# Patient Record
Sex: MALE | Race: OTHER | NOT HISPANIC OR LATINO
[De-identification: names, ages, dates, MRNs, and addresses within clinical notes are randomized per-mention and may not be internally consistent; named-entity substitution may affect disease eponyms.]

---

## 2018-02-05 ENCOUNTER — TRANSCRIPTION ENCOUNTER (OUTPATIENT)
Age: 33
End: 2018-02-05

## 2018-02-09 ENCOUNTER — APPOINTMENT (OUTPATIENT)
Dept: UROLOGY | Facility: CLINIC | Age: 33
End: 2018-02-09

## 2021-01-30 ENCOUNTER — EMERGENCY (EMERGENCY)
Facility: HOSPITAL | Age: 36
LOS: 1 days | Discharge: HOME | End: 2021-01-30
Attending: EMERGENCY MEDICINE
Payer: COMMERCIAL

## 2021-01-30 VITALS
HEART RATE: 93 BPM | SYSTOLIC BLOOD PRESSURE: 101 MMHG | DIASTOLIC BLOOD PRESSURE: 69 MMHG | OXYGEN SATURATION: 98 % | RESPIRATION RATE: 20 BRPM

## 2021-01-30 VITALS
HEART RATE: 128 BPM | WEIGHT: 160.06 LBS | HEIGHT: 68 IN | OXYGEN SATURATION: 97 % | TEMPERATURE: 101 F | DIASTOLIC BLOOD PRESSURE: 73 MMHG | SYSTOLIC BLOOD PRESSURE: 114 MMHG | RESPIRATION RATE: 20 BRPM

## 2021-01-30 LAB
ALBUMIN SERPL ELPH-MCNC: 3.5 G/DL — SIGNIFICANT CHANGE UP (ref 3.3–5)
ALP SERPL-CCNC: 49 U/L — SIGNIFICANT CHANGE UP (ref 40–120)
ALT FLD-CCNC: 37 U/L — SIGNIFICANT CHANGE UP (ref 10–45)
ANION GAP SERPL CALC-SCNC: 14 MMOL/L — SIGNIFICANT CHANGE UP (ref 5–17)
APTT BLD: 35.3 SEC — SIGNIFICANT CHANGE UP (ref 27.5–35.5)
AST SERPL-CCNC: 42 U/L — HIGH (ref 10–40)
BASOPHILS # BLD AUTO: 0 K/UL — SIGNIFICANT CHANGE UP (ref 0–0.2)
BASOPHILS NFR BLD AUTO: 0 % — SIGNIFICANT CHANGE UP (ref 0–2)
BILIRUB SERPL-MCNC: 0.2 MG/DL — SIGNIFICANT CHANGE UP (ref 0.2–1.2)
BUN SERPL-MCNC: 10 MG/DL — SIGNIFICANT CHANGE UP (ref 7–23)
CALCIUM SERPL-MCNC: 8.2 MG/DL — LOW (ref 8.4–10.5)
CHLORIDE SERPL-SCNC: 101 MMOL/L — SIGNIFICANT CHANGE UP (ref 96–108)
CO2 SERPL-SCNC: 20 MMOL/L — LOW (ref 22–31)
CREAT SERPL-MCNC: 0.82 MG/DL — SIGNIFICANT CHANGE UP (ref 0.5–1.3)
D DIMER BLD IA.RAPID-MCNC: 265 NG/ML DDU — HIGH
EOSINOPHIL # BLD AUTO: 0 K/UL — SIGNIFICANT CHANGE UP (ref 0–0.5)
EOSINOPHIL NFR BLD AUTO: 0 % — SIGNIFICANT CHANGE UP (ref 0–6)
GLUCOSE SERPL-MCNC: 113 MG/DL — HIGH (ref 70–99)
HCT VFR BLD CALC: 40.8 % — SIGNIFICANT CHANGE UP (ref 39–50)
HGB BLD-MCNC: 13 G/DL — SIGNIFICANT CHANGE UP (ref 13–17)
INR BLD: 1.13 RATIO — SIGNIFICANT CHANGE UP (ref 0.88–1.16)
LYMPHOCYTES # BLD AUTO: 0.64 K/UL — LOW (ref 1–3.3)
LYMPHOCYTES # BLD AUTO: 20.2 % — SIGNIFICANT CHANGE UP (ref 13–44)
MANUAL SMEAR VERIFICATION: SIGNIFICANT CHANGE UP
MCHC RBC-ENTMCNC: 27.5 PG — SIGNIFICANT CHANGE UP (ref 27–34)
MCHC RBC-ENTMCNC: 31.9 GM/DL — LOW (ref 32–36)
MCV RBC AUTO: 86.3 FL — SIGNIFICANT CHANGE UP (ref 80–100)
MONOCYTES # BLD AUTO: 0.03 K/UL — SIGNIFICANT CHANGE UP (ref 0–0.9)
MONOCYTES NFR BLD AUTO: 0.9 % — LOW (ref 2–14)
NEUTROPHILS # BLD AUTO: 2.49 K/UL — SIGNIFICANT CHANGE UP (ref 1.8–7.4)
NEUTROPHILS NFR BLD AUTO: 76.3 % — SIGNIFICANT CHANGE UP (ref 43–77)
NEUTS BAND # BLD: 2.6 % — SIGNIFICANT CHANGE UP (ref 0–8)
PLAT MORPH BLD: NORMAL — SIGNIFICANT CHANGE UP
PLATELET # BLD AUTO: 239 K/UL — SIGNIFICANT CHANGE UP (ref 150–400)
POTASSIUM SERPL-MCNC: 4.2 MMOL/L — SIGNIFICANT CHANGE UP (ref 3.5–5.3)
POTASSIUM SERPL-SCNC: 4.2 MMOL/L — SIGNIFICANT CHANGE UP (ref 3.5–5.3)
PROT SERPL-MCNC: 8.3 G/DL — SIGNIFICANT CHANGE UP (ref 6–8.3)
PROTHROM AB SERPL-ACNC: 13.5 SEC — SIGNIFICANT CHANGE UP (ref 10.6–13.6)
RBC # BLD: 4.73 M/UL — SIGNIFICANT CHANGE UP (ref 4.2–5.8)
RBC # FLD: 15.4 % — HIGH (ref 10.3–14.5)
RBC BLD AUTO: SIGNIFICANT CHANGE UP
SODIUM SERPL-SCNC: 135 MMOL/L — SIGNIFICANT CHANGE UP (ref 135–145)
WBC # BLD: 3.15 K/UL — LOW (ref 3.8–10.5)
WBC # FLD AUTO: 3.15 K/UL — LOW (ref 3.8–10.5)

## 2021-01-30 PROCEDURE — 71045 X-RAY EXAM CHEST 1 VIEW: CPT | Mod: 26

## 2021-01-30 PROCEDURE — 80053 COMPREHEN METABOLIC PANEL: CPT

## 2021-01-30 PROCEDURE — 99285 EMERGENCY DEPT VISIT HI MDM: CPT

## 2021-01-30 PROCEDURE — 93971 EXTREMITY STUDY: CPT

## 2021-01-30 PROCEDURE — 71045 X-RAY EXAM CHEST 1 VIEW: CPT

## 2021-01-30 PROCEDURE — 85025 COMPLETE CBC W/AUTO DIFF WBC: CPT

## 2021-01-30 PROCEDURE — 85730 THROMBOPLASTIN TIME PARTIAL: CPT

## 2021-01-30 PROCEDURE — 93971 EXTREMITY STUDY: CPT | Mod: 26

## 2021-01-30 PROCEDURE — 85379 FIBRIN DEGRADATION QUANT: CPT

## 2021-01-30 PROCEDURE — 99284 EMERGENCY DEPT VISIT MOD MDM: CPT

## 2021-01-30 PROCEDURE — 85610 PROTHROMBIN TIME: CPT

## 2021-01-30 RX ORDER — ACETAMINOPHEN 500 MG
650 TABLET ORAL ONCE
Refills: 0 | Status: COMPLETED | OUTPATIENT
Start: 2021-01-30 | End: 2021-01-30

## 2021-01-30 RX ORDER — SODIUM CHLORIDE 9 MG/ML
1000 INJECTION, SOLUTION INTRAVENOUS ONCE
Refills: 0 | Status: COMPLETED | OUTPATIENT
Start: 2021-01-30 | End: 2021-01-30

## 2021-01-30 RX ADMIN — Medication 650 MILLIGRAM(S): at 15:55

## 2021-01-30 RX ADMIN — SODIUM CHLORIDE 1000 MILLILITER(S): 9 INJECTION, SOLUTION INTRAVENOUS at 15:55

## 2021-01-30 NOTE — ED PROVIDER NOTE - OBJECTIVE STATEMENT
35y M pmhx spontaneous DVT LLE ~10 years ago, formerly on AC ~5 years ago presents to ED c/o L groin pain x 1 day. Known COVID+ yesterday at , cxr w/ pna given Doxycycline, symptomatic x 7 days w/ fever and mild dry cough responsive to Tylenol last taken 10AM today. Presents to ED due to new groin pain which is similar in nature to previous DVT. Follows regularly w/ hematology. Endorses "chest pressure" with deep inspiration. Denies HA, chest pain, SOB, pleuritic cp, calf pain or edema.

## 2021-01-30 NOTE — ED PROVIDER NOTE - PATIENT PORTAL LINK FT
You can access the FollowMyHealth Patient Portal offered by [facility] by registering at the following website: [email or web address]. By joining [facility]’s FollowMyHealth portal, you will also be able to view your health information using other applications (apps) compatible with our system.

## 2021-01-30 NOTE — ED ADULT NURSE NOTE - OBJECTIVE STATEMENT
35 y m came to the ed c/o fever, cough, sob and thigh pain. patient states he is day 8 of being covid positive. said he became considered because he had tightness in his thigh which was the same symptom he had 10 years ago when he had a dvt. patient is a/ox3. says his covid symptoms are improving. denies any shortness of breath or chest pain. patient is a/ox3. skin is warm and dry. no weakness in extremities. no decreased in rom. denies trauma.

## 2021-01-30 NOTE — ED PROVIDER NOTE - NSFOLLOWUPINSTRUCTIONS_ED_ALL_ED_FT
1. You were seen in the ED for symptoms of the novel coronavirus (COVID-19).    2. YOU MUST SELF-QUARANTINE. Avoid close contact anyone until you meet the below criteria.     3. You are eligible to return to work or usual activities if:            1. It has been at least 10 days since your symptoms started AND            2. No fevers (temperature over 100.4F) for at least 24 hours AND            3. Your cough and shortness of breath has improved     4. Return to the ED for trouble catching your breath when you are resting.    5. You may take over the counter Tylenol 1000mg every 6 hours and/or Ibuprofen 600mg every 6 hours as needed for fever or pain. Take with food. You may also take over the counter cough medicine for your cough, hot water with honey is also effective. Every few hours go outside, away from others, and take some controlled deep breathes to expand your lungs.

## 2021-01-30 NOTE — ED PROVIDER NOTE - PHYSICAL EXAMINATION
GEN: Pt in NAD, non-toxic, alert.  PSYCH: Affect appropriate.  EYES: Sclera white w/o injection.   ENT: No dysphonia. Neck supple FROM. Trachea midline.   RESP: No evidence of respiratory distress, speaking in full sentences, SpO2 99 on RA. b/l basilar rales. No wheezing.  CARDIAC: RRR, clear distinct S1, S2, no appreciable murmurs.  ABD: Abdomen soft, non-tender.  MSK: +mild L groin ttp, no LLE edema or swelling.  VASC: 2+ radial and dorsalis pedis pulses b/l. No edema or calf tenderness.  SKIN: No rashes on the trunk.

## 2021-01-30 NOTE — ED PROVIDER NOTE - ATTENDING CONTRIBUTION TO CARE
35y M pmhx spontaneous DVT LLE ~10 years ago, formerly on AC ~5 years ago presents to ED c/o L groin pain x 1 day with no hernia similar to his prior dvt but leg with no swelling or tendernesss, dimer sent, also with covid tachy febvrile, ivf, apap, supportive care, cxr, labs.

## 2021-02-04 ENCOUNTER — INPATIENT (INPATIENT)
Facility: HOSPITAL | Age: 36
LOS: 4 days | Discharge: HOME | DRG: 177 | End: 2021-02-09
Attending: HOSPITALIST | Admitting: STUDENT IN AN ORGANIZED HEALTH CARE EDUCATION/TRAINING PROGRAM
Payer: COMMERCIAL

## 2021-02-04 VITALS
HEART RATE: 120 BPM | TEMPERATURE: 101 F | WEIGHT: 160.06 LBS | RESPIRATION RATE: 26 BRPM | OXYGEN SATURATION: 94 % | HEIGHT: 68 IN | DIASTOLIC BLOOD PRESSURE: 65 MMHG | SYSTOLIC BLOOD PRESSURE: 103 MMHG

## 2021-02-04 DIAGNOSIS — J96.01 ACUTE RESPIRATORY FAILURE WITH HYPOXIA: ICD-10-CM

## 2021-02-04 DIAGNOSIS — K51.90 ULCERATIVE COLITIS, UNSPECIFIED, WITHOUT COMPLICATIONS: ICD-10-CM

## 2021-02-04 DIAGNOSIS — R74.01 ELEVATION OF LEVELS OF LIVER TRANSAMINASE LEVELS: ICD-10-CM

## 2021-02-04 DIAGNOSIS — U07.1 COVID-19: ICD-10-CM

## 2021-02-04 LAB
ALBUMIN SERPL ELPH-MCNC: 3 G/DL — LOW (ref 3.3–5)
ALBUMIN SERPL ELPH-MCNC: 3.4 G/DL — SIGNIFICANT CHANGE UP (ref 3.3–5)
ALP SERPL-CCNC: 36 U/L — LOW (ref 40–120)
ALP SERPL-CCNC: 46 U/L — SIGNIFICANT CHANGE UP (ref 40–120)
ALT FLD-CCNC: 78 U/L — HIGH (ref 10–45)
ALT FLD-CCNC: 97 U/L — HIGH (ref 10–45)
ANION GAP SERPL CALC-SCNC: 12 MMOL/L — SIGNIFICANT CHANGE UP (ref 5–17)
ANION GAP SERPL CALC-SCNC: 17 MMOL/L — SIGNIFICANT CHANGE UP (ref 5–17)
APTT BLD: 29.3 SEC — SIGNIFICANT CHANGE UP (ref 27.5–35.5)
AST SERPL-CCNC: 104 U/L — HIGH (ref 10–40)
AST SERPL-CCNC: 65 U/L — HIGH (ref 10–40)
BASE EXCESS BLDV CALC-SCNC: -4 MMOL/L — LOW (ref -2–2)
BASOPHILS # BLD AUTO: 0.01 K/UL — SIGNIFICANT CHANGE UP (ref 0–0.2)
BASOPHILS NFR BLD AUTO: 0.2 % — SIGNIFICANT CHANGE UP (ref 0–2)
BILIRUB SERPL-MCNC: 0.3 MG/DL — SIGNIFICANT CHANGE UP (ref 0.2–1.2)
BILIRUB SERPL-MCNC: 0.4 MG/DL — SIGNIFICANT CHANGE UP (ref 0.2–1.2)
BUN SERPL-MCNC: 10 MG/DL — SIGNIFICANT CHANGE UP (ref 7–23)
BUN SERPL-MCNC: 11 MG/DL — SIGNIFICANT CHANGE UP (ref 7–23)
CA-I SERPL-SCNC: 1 MMOL/L — LOW (ref 1.12–1.3)
CALCIUM SERPL-MCNC: 7.2 MG/DL — LOW (ref 8.4–10.5)
CALCIUM SERPL-MCNC: 8.2 MG/DL — LOW (ref 8.4–10.5)
CHLORIDE BLDV-SCNC: 112 MMOL/L — HIGH (ref 96–108)
CHLORIDE SERPL-SCNC: 105 MMOL/L — SIGNIFICANT CHANGE UP (ref 96–108)
CHLORIDE SERPL-SCNC: 98 MMOL/L — SIGNIFICANT CHANGE UP (ref 96–108)
CO2 BLDV-SCNC: 21 MMOL/L — LOW (ref 22–30)
CO2 SERPL-SCNC: 18 MMOL/L — LOW (ref 22–31)
CO2 SERPL-SCNC: 19 MMOL/L — LOW (ref 22–31)
CREAT SERPL-MCNC: 0.81 MG/DL — SIGNIFICANT CHANGE UP (ref 0.5–1.3)
CREAT SERPL-MCNC: 0.84 MG/DL — SIGNIFICANT CHANGE UP (ref 0.5–1.3)
CRP SERPL-MCNC: 8.01 MG/DL — HIGH (ref 0–0.4)
D DIMER BLD IA.RAPID-MCNC: 424 NG/ML DDU — HIGH
EOSINOPHIL # BLD AUTO: 0 K/UL — SIGNIFICANT CHANGE UP (ref 0–0.5)
EOSINOPHIL NFR BLD AUTO: 0 % — SIGNIFICANT CHANGE UP (ref 0–6)
GAS PNL BLDV: 134 MMOL/L — LOW (ref 135–145)
GAS PNL BLDV: SIGNIFICANT CHANGE UP
GLUCOSE BLDV-MCNC: 98 MG/DL — SIGNIFICANT CHANGE UP (ref 70–99)
GLUCOSE SERPL-MCNC: 89 MG/DL — SIGNIFICANT CHANGE UP (ref 70–99)
GLUCOSE SERPL-MCNC: 96 MG/DL — SIGNIFICANT CHANGE UP (ref 70–99)
HCO3 BLDV-SCNC: 20 MMOL/L — LOW (ref 21–29)
HCT VFR BLD CALC: 41 % — SIGNIFICANT CHANGE UP (ref 39–50)
HCT VFR BLDA CALC: 33 % — LOW (ref 39–50)
HGB BLD CALC-MCNC: 10.7 G/DL — LOW (ref 13–17)
HGB BLD-MCNC: 12.7 G/DL — LOW (ref 13–17)
IMM GRANULOCYTES NFR BLD AUTO: 1.1 % — SIGNIFICANT CHANGE UP (ref 0–1.5)
INR BLD: 1.24 RATIO — HIGH (ref 0.88–1.16)
LACTATE BLDV-MCNC: 0.9 MMOL/L — SIGNIFICANT CHANGE UP (ref 0.7–2)
LYMPHOCYTES # BLD AUTO: 2.45 K/UL — SIGNIFICANT CHANGE UP (ref 1–3.3)
LYMPHOCYTES # BLD AUTO: 38.2 % — SIGNIFICANT CHANGE UP (ref 13–44)
MCHC RBC-ENTMCNC: 26.6 PG — LOW (ref 27–34)
MCHC RBC-ENTMCNC: 31 GM/DL — LOW (ref 32–36)
MCV RBC AUTO: 86 FL — SIGNIFICANT CHANGE UP (ref 80–100)
MONOCYTES # BLD AUTO: 0.15 K/UL — SIGNIFICANT CHANGE UP (ref 0–0.9)
MONOCYTES NFR BLD AUTO: 2.3 % — SIGNIFICANT CHANGE UP (ref 2–14)
NEUTROPHILS # BLD AUTO: 3.74 K/UL — SIGNIFICANT CHANGE UP (ref 1.8–7.4)
NEUTROPHILS NFR BLD AUTO: 58.2 % — SIGNIFICANT CHANGE UP (ref 43–77)
NRBC # BLD: 0 /100 WBCS — SIGNIFICANT CHANGE UP (ref 0–0)
PCO2 BLDV: 33 MMHG — LOW (ref 35–50)
PH BLDV: 7.4 — SIGNIFICANT CHANGE UP (ref 7.35–7.45)
PLATELET # BLD AUTO: 270 K/UL — SIGNIFICANT CHANGE UP (ref 150–400)
PO2 BLDV: 40 MMHG — SIGNIFICANT CHANGE UP (ref 25–45)
POTASSIUM BLDV-SCNC: 3.9 MMOL/L — SIGNIFICANT CHANGE UP (ref 3.5–5.3)
POTASSIUM SERPL-MCNC: 4.1 MMOL/L — SIGNIFICANT CHANGE UP (ref 3.5–5.3)
POTASSIUM SERPL-MCNC: 4.9 MMOL/L — SIGNIFICANT CHANGE UP (ref 3.5–5.3)
POTASSIUM SERPL-SCNC: 4.1 MMOL/L — SIGNIFICANT CHANGE UP (ref 3.5–5.3)
POTASSIUM SERPL-SCNC: 4.9 MMOL/L — SIGNIFICANT CHANGE UP (ref 3.5–5.3)
PROCALCITONIN SERPL-MCNC: 0.11 NG/ML — HIGH (ref 0.02–0.1)
PROT SERPL-MCNC: 7.2 G/DL — SIGNIFICANT CHANGE UP (ref 6–8.3)
PROT SERPL-MCNC: 8.7 G/DL — HIGH (ref 6–8.3)
PROTHROM AB SERPL-ACNC: 14.7 SEC — HIGH (ref 10.6–13.6)
RBC # BLD: 4.77 M/UL — SIGNIFICANT CHANGE UP (ref 4.2–5.8)
RBC # FLD: 15.7 % — HIGH (ref 10.3–14.5)
SAO2 % BLDV: 70 % — SIGNIFICANT CHANGE UP (ref 67–88)
SARS-COV-2 RNA SPEC QL NAA+PROBE: DETECTED
SODIUM SERPL-SCNC: 134 MMOL/L — LOW (ref 135–145)
SODIUM SERPL-SCNC: 135 MMOL/L — SIGNIFICANT CHANGE UP (ref 135–145)
TROPONIN T, HIGH SENSITIVITY RESULT: <6 NG/L — SIGNIFICANT CHANGE UP (ref 0–51)
WBC # BLD: 6.42 K/UL — SIGNIFICANT CHANGE UP (ref 3.8–10.5)
WBC # FLD AUTO: 6.42 K/UL — SIGNIFICANT CHANGE UP (ref 3.8–10.5)

## 2021-02-04 PROCEDURE — G1004: CPT

## 2021-02-04 PROCEDURE — 99285 EMERGENCY DEPT VISIT HI MDM: CPT

## 2021-02-04 PROCEDURE — 74177 CT ABD & PELVIS W/CONTRAST: CPT | Mod: 26,ME

## 2021-02-04 PROCEDURE — 99223 1ST HOSP IP/OBS HIGH 75: CPT

## 2021-02-04 PROCEDURE — 93010 ELECTROCARDIOGRAM REPORT: CPT

## 2021-02-04 PROCEDURE — 71275 CT ANGIOGRAPHY CHEST: CPT | Mod: 26,ME

## 2021-02-04 PROCEDURE — 71045 X-RAY EXAM CHEST 1 VIEW: CPT | Mod: 26

## 2021-02-04 RX ORDER — REMDESIVIR 5 MG/ML
INJECTION INTRAVENOUS
Refills: 0 | Status: COMPLETED | OUTPATIENT
Start: 2021-02-04 | End: 2021-02-09

## 2021-02-04 RX ORDER — ACETAMINOPHEN 500 MG
325 TABLET ORAL ONCE
Refills: 0 | Status: COMPLETED | OUTPATIENT
Start: 2021-02-04 | End: 2021-02-04

## 2021-02-04 RX ORDER — ACETAMINOPHEN 500 MG
650 TABLET ORAL EVERY 6 HOURS
Refills: 0 | Status: DISCONTINUED | OUTPATIENT
Start: 2021-02-04 | End: 2021-02-09

## 2021-02-04 RX ORDER — MERCAPTOPURINE 50 MG/1
75 TABLET ORAL DAILY
Refills: 0 | Status: DISCONTINUED | OUTPATIENT
Start: 2021-02-05 | End: 2021-02-09

## 2021-02-04 RX ORDER — REMDESIVIR 5 MG/ML
200 INJECTION INTRAVENOUS EVERY 24 HOURS
Refills: 0 | Status: COMPLETED | OUTPATIENT
Start: 2021-02-04 | End: 2021-02-05

## 2021-02-04 RX ORDER — KETOROLAC TROMETHAMINE 30 MG/ML
15 SYRINGE (ML) INJECTION ONCE
Refills: 0 | Status: DISCONTINUED | OUTPATIENT
Start: 2021-02-04 | End: 2021-02-04

## 2021-02-04 RX ORDER — SODIUM CHLORIDE 9 MG/ML
1000 INJECTION INTRAMUSCULAR; INTRAVENOUS; SUBCUTANEOUS ONCE
Refills: 0 | Status: COMPLETED | OUTPATIENT
Start: 2021-02-04 | End: 2021-02-04

## 2021-02-04 RX ORDER — DEXAMETHASONE 0.5 MG/5ML
6 ELIXIR ORAL DAILY
Refills: 0 | Status: DISCONTINUED | OUTPATIENT
Start: 2021-02-05 | End: 2021-02-09

## 2021-02-04 RX ORDER — INFLUENZA VIRUS VACCINE 15; 15; 15; 15 UG/.5ML; UG/.5ML; UG/.5ML; UG/.5ML
0.5 SUSPENSION INTRAMUSCULAR ONCE
Refills: 0 | Status: DISCONTINUED | OUTPATIENT
Start: 2021-02-04 | End: 2021-02-09

## 2021-02-04 RX ORDER — ENOXAPARIN SODIUM 100 MG/ML
40 INJECTION SUBCUTANEOUS DAILY
Refills: 0 | Status: DISCONTINUED | OUTPATIENT
Start: 2021-02-04 | End: 2021-02-09

## 2021-02-04 RX ORDER — MERCAPTOPURINE 50 MG/1
75 TABLET ORAL
Qty: 0 | Refills: 0 | DISCHARGE

## 2021-02-04 RX ORDER — ALBUTEROL 90 UG/1
2 AEROSOL, METERED ORAL EVERY 6 HOURS
Refills: 0 | Status: DISCONTINUED | OUTPATIENT
Start: 2021-02-04 | End: 2021-02-09

## 2021-02-04 RX ORDER — DEXAMETHASONE 0.5 MG/5ML
6 ELIXIR ORAL ONCE
Refills: 0 | Status: COMPLETED | OUTPATIENT
Start: 2021-02-04 | End: 2021-02-04

## 2021-02-04 RX ADMIN — ENOXAPARIN SODIUM 40 MILLIGRAM(S): 100 INJECTION SUBCUTANEOUS at 22:48

## 2021-02-04 RX ADMIN — Medication 6 MILLIGRAM(S): at 13:56

## 2021-02-04 RX ADMIN — SODIUM CHLORIDE 1000 MILLILITER(S): 9 INJECTION INTRAMUSCULAR; INTRAVENOUS; SUBCUTANEOUS at 13:56

## 2021-02-04 RX ADMIN — SODIUM CHLORIDE 1000 MILLILITER(S): 9 INJECTION INTRAMUSCULAR; INTRAVENOUS; SUBCUTANEOUS at 15:48

## 2021-02-04 RX ADMIN — Medication 15 MILLIGRAM(S): at 13:56

## 2021-02-04 RX ADMIN — SODIUM CHLORIDE 1000 MILLILITER(S): 9 INJECTION INTRAMUSCULAR; INTRAVENOUS; SUBCUTANEOUS at 14:44

## 2021-02-04 RX ADMIN — Medication 325 MILLIGRAM(S): at 13:55

## 2021-02-04 NOTE — ED ADULT NURSE NOTE - CHIEF COMPLAINT QUOTE
covid+, diff breathing, feeling worse since d/c home from ED, difficulty speaking in full sentences, +abd pain, (completed dose of doxy, seen at [facility] ED 1/30/21)

## 2021-02-04 NOTE — ED PROVIDER NOTE - NS ED ROS FT
CONST: + fevers, no chills  EYES: no pain, no vision changes  ENT: no sore throat, no ear pain, no change in hearing  CV: no chest pain, no leg swelling  RESP: + shortness of breath, + cough  ABD: + abdominal pain, no nausea, no vomiting, no diarrhea  : no dysuria, no flank pain, no hematuria  MSK: no back pain, no extremity pain  NEURO: no headache or additional neurologic complaints  HEME: no easy bleeding  SKIN:  no rash

## 2021-02-04 NOTE — H&P ADULT - NSHPPHYSICALEXAM_GEN_ALL_CORE
T(C): 36.9 (02-04-21 @ 20:10), Max: 39.6 (02-04-21 @ 13:38)  HR: 91 (02-04-21 @ 20:10) (91 - 125)  BP: 98/79 (02-04-21 @ 20:10) (98/79 - 115/77)  RR: 28 (02-04-21 @ 20:10) (23 - 28)  SpO2: 100% (02-04-21 @ 20:10) (89% - 100%)    Gen: (fe)male in NAD, appears comfortable, no diaphoresis  HEENT: NCAT, MMM, neck soft and supple  CV: +S1/S2, no m/r/g  Resp: CTAB, no w/r/r  GI: normoactive BS, soft, NTND, no rebounding/guarding  Ext: No LE edema, extremities appear warm and well perfused   Neuro: AOx3, no focal deficits, CNII-XII grossly intact  Psych: No SI/HI/AVH, appropriate affect  Skin: no petechiae, ecchymosis or maculopapular rash noted T(C): 36.9 (02-04-21 @ 20:10), Max: 39.6 (02-04-21 @ 13:38)  HR: 91 (02-04-21 @ 20:10) (91 - 125)  BP: 98/79 (02-04-21 @ 20:10) (98/79 - 115/77)  RR: 28 (02-04-21 @ 20:10) (23 - 28)  SpO2: 100% (02-04-21 @ 20:10) (89% - 100%)    Gen: male in NAD, appears comfortable, no diaphoresis  HEENT: NCAT, MMM, neck soft and supple  CV: +S1/S2, no m/r/g  Resp: CTAB, no w/r/r  GI: normoactive BS, soft, NTND, no rebounding/guarding  Ext: No LE edema, extremities appear warm and well perfused   Neuro: AOx3, no focal deficits, CNII-XII grossly intact  Psych: No SI/HI/AVH, appropriate affect  Skin: no petechiae, ecchymosis or maculopapular rash noted T(C): 36.9 (02-04-21 @ 20:10), Max: 39.6 (02-04-21 @ 13:38)  HR: 91 (02-04-21 @ 20:10) (91 - 125)  BP: 98/79 (02-04-21 @ 20:10) (98/79 - 115/77)  RR: 28 (02-04-21 @ 20:10) (23 - 28)  SpO2: 100% (02-04-21 @ 20:10) (89% - 100%)    Gen: male in NAD, appears comfortable, no diaphoresis  HEENT: NCAT, MMM, neck soft and supple  CV: +S1/S2  Resp: no respiratory accessory muscle use, bilateral BS  GI: normoactive BS, soft, ND, no rebounding/guarding, tender to deep palpation RUQ  Ext: No LE edema, extremities appear warm and well perfused   Neuro: AOx3, no focal deficits, CNII-XII grossly intact  Psych: No SI/HI/AVH, appropriate affect  Skin: no petechiae, ecchymosis or maculopapular rash noted

## 2021-02-04 NOTE — H&P ADULT - ASSESSMENT
35M with no significant PMHx presents with acute hypoxic respiratory failure in setting of COVID-19 infection. 35M with PMHx of ulcerative colitis and prior DVT (10+ years prior) presents with 13 day history of fever, backache, cough, and dyspnea on exertion for two weeks. Patient suffering from acute hypoxic respiratory failure secondary to COVID-19.

## 2021-02-04 NOTE — ED PROVIDER NOTE - PROGRESS NOTE DETAILS
Lisa Washburn PGY-1: D-dimer 424. CTA pending. Lisa Washburn PGY-1: Transaminitis, elevated lipase. Will CT A/P. Lisa Washburn PGY-1: With patient's permission, spoke with his mother and updated her on patient's status. Lisa Washburn PGY-1: CT negative for PE. Spoke with transfer center. No beds available for load balancing. Will admit to hospitalist. Pt was sign out to me from. will admit to hospital for hypoxia 2nd to COVID

## 2021-02-04 NOTE — ED PROVIDER NOTE - OBJECTIVE STATEMENT
44 year old male with PMH DVT LLE 10 years ago (not currently anticoagulated), tested COVID positive 6 days ago presenting with worsening SOB. Patient states he first began to experience COVID like symptoms 12 days ago. Has been spiking daily fevers for which he is taking Tylenol. Over past 2-3 days has felt progressively more SOB at rest and with exertion. Endoreses chest 44 year old male with PMH DVT LLE 10 years ago (not currently anticoagulated), tested COVID positive 6 days ago presenting with worsening SOB. Patient states he first began to experience COVID like symptoms 12 days ago. Has been spiking daily fevers for which he is taking Tylenol. Over past 2-3 days has felt progressively more SOB at rest and with exertion. Endorses chest soreness from coughing and RUQ abdominal pain. Notes decreased PO intake due to lack of appetite. Denies N/V/D, hematuria, dysuria, LE edema, syncope. Recently evaluated in ED 1/10 for DVT of LLE with negative workup.

## 2021-02-04 NOTE — H&P ADULT - PROBLEM SELECTOR PLAN 1
-Start Dexamethasone & Remdesivir  -Address AHRF as below  -Symptom management  -DVT PPx  -Trend inflammatory markers q72 hours

## 2021-02-04 NOTE — ED ADULT TRIAGE NOTE - CHIEF COMPLAINT QUOTE
covid+, diff breathing, feeling worse since d/c home from ED, difficulty speaking in full sentences, +abd pain, (completed dose of doxy, seen at [facility] ED 1/30/21)

## 2021-02-04 NOTE — ED PROVIDER NOTE - PHYSICAL EXAMINATION
GENERAL: Awake, alert, ill appearing  HEENT: NC/AT, moist mucous membranes, PERRL, EOMI  LUNGS: Diminished breath sounds bilaterally, tachypneic  CARDIAC: Tachycardic, no m/r/g  ABDOMEN: Soft, normal BS, non distended, no rebound, no guarding, RUQ tenderness  BACK: No midline spinal tenderness, no CVA tenderness  EXT: No edema, no calf tenderness, 2+ DP pulses bilaterally, no deformities.  NEURO: A&Ox3. Moving all extremities. No FND.   SKIN: Warm and dry. No rash.  PSYCH: Normal affect.

## 2021-02-04 NOTE — H&P ADULT - PROBLEM SELECTOR PLAN 2
-Oxygen as needed to maintain saturation, wean as tolerated, prone as needed, check ambulatory and resting O2 saturation on room air daily  -Incentive spirometer -Oxygen as needed to maintain saturation, wean as tolerated, prone as needed, check ambulatory and resting O2 saturation on room air daily  -Incentive spirometer  -Currently on room air at least at rest  -If does not improve then need to consider PE given prior DVT

## 2021-02-04 NOTE — ED PROVIDER NOTE - ATTENDING CONTRIBUTION TO CARE
35 M pmhx LLE ~10 years ago, formerly on AC ~5 years ago presents to ED COVID positive 12 days ago, w/ daily fevers and over the past 2-3 days w/ worsening SOB decreased PO intake over the past 5 days w/ RUQ pain, pt reports weakness, and "run down". (completed dose of doxy, seen at [facility] ED 1/30/21) pna given Doxycycline, symptomatic x 7 days w/ fever and mild dry cough responsive to Tylenol last taken 10AM today. Prior ER eval on 1/30 w/ negative duplex and sent home for follow up  suspect sx are likely 2/2 covid pneumonia, also in ddx possible PE will have labs, and likely CTA

## 2021-02-04 NOTE — ED ADULT NURSE NOTE - OBJECTIVE STATEMENT
36 y/o male coming in for SOB. A&Ox3. Ambulatory. PMH ulcerative colitis. Patient reports that he has been SOB, with fevers, cough, and diarrhea (x3 days) for the past 15 days. Patient reports he is COVID positive, unsure of when he tested positive. Patient using accessory muscles to breath, tachypneic at 26 breaths per minute, SPO2 initially 89% on room air, patient placed on 4L NC improved to 98%. Capillary refill less than 2 seconds. Abdomen is soft, non distended, tender in the right mid epigastric region rated 4/10. Patient reports taking 650mg of tylenol around 12pm today. Patient denies chest pain, chills, n/v, urinary symptoms. Safety measures maintained. Bed in the lowest position. Call bell within reach.  MD at the bedside. No acute distress noted or further complaints at this time.

## 2021-02-04 NOTE — ED PROVIDER NOTE - CLINICAL SUMMARY MEDICAL DECISION MAKING FREE TEXT BOX
35 year old M tested COVID positive 12 days ago p/w worsening SOB. Febrile, tachypneic, tachycardic, hypoxic on RA. Will workup for PE. Admit for hypoxia. 35 year old M tested COVID positive 12 days ago p/w worsening SOB. Febrile, tachypneic, tachycardic, hypoxic on RA. Will workup for PE. CT A/P given hx of colitis and abdominal pain. Admit for hypoxia.

## 2021-02-04 NOTE — ED ADULT NURSE NOTE - NSIMPLEMENTINTERV_GEN_ALL_ED
Implemented All Universal Safety Interventions:  [place] to call system. Call bell, personal items and telephone within reach. Instruct patient to call for assistance. Room bathroom lighting operational. Non-slip footwear when patient is off stretcher. Physically safe environment: no spills, clutter or unnecessary equipment. Stretcher in lowest position, wheels locked, appropriate side rails in place.

## 2021-02-04 NOTE — H&P ADULT - NSICDXPASTMEDICALHX_GEN_ALL_CORE_FT
PAST MEDICAL HISTORY:  Latent Tuberculosis      PAST MEDICAL HISTORY:  Latent Tuberculosis     Ulcerative colitis

## 2021-02-04 NOTE — H&P ADULT - NSHPREVIEWOFSYSTEMS_GEN_ALL_CORE
Gen: no night sweats or change in appetite  Eyes: no changes in vision or diplopia   ENT: no epistaxis, sinus pain, gingival bleeding, odynophagia or dysphagia  CV: no CP, PND or palpitations  Resp: no cough, wheezing, or hemoptysis  GI: no hematemesis, hematochezia, or melena  : no dysuria, polyuria, or hematuria  MSK: no arthralgias or joint swelling   Neuro: no gross sensory changes, numbness, focal deficits  Psych: no depression or changes in concentration  Heme/Onc: no purpura, petechiae or night sweats  Skin: no pruritus, hair loss or skin lesions  All: no photosensitivity, no complaints of anaphylaxis (SOB, throat swelling) Gen: no night sweats, decrease appetite  Eyes: no changes in vision or diplopia   ENT: no epistaxis, sinus pain, gingival bleeding, odynophagia or dysphagia  CV: no CP, PND or palpitations  Resp: +cough, no wheezing  GI: no hematemesis, hematochezia, or melena  : no dysuria, polyuria, or hematuria  MSK: no arthralgias or joint swelling   Neuro: no gross sensory changes, numbness, focal deficits  Psych: no depression or changes in concentration  Heme/Onc: no purpura, petechiae or night sweats  Skin: no pruritus, hair loss or skin lesions  All: no photosensitivity, no complaints of anaphylaxis (SOB, throat swelling)

## 2021-02-04 NOTE — H&P ADULT - HISTORY OF PRESENT ILLNESS
35M with PMHx of ulcerative colitis and prior DVT (10+ years prior) presents with 13 day history of fever, backache, cough, and dyspnea on exertion for two weeks. Patient began feeling symptoms approximately two weeks prior. They have not improved significant. Endorses periodic non-productive cough. He has known COVID-19 contact. Patient testing positive approximately one week prior. He has taken Doxycycline in the interim. No other infusions. Patient presenting today because of dyspnea on exertion (even with minimal exertion). He has been measuring his pulse ox at home and it was low 90s. He was given dex in the ED. Patient seen in the PICU. He is complaining of fatigue.

## 2021-02-05 LAB
ALBUMIN SERPL ELPH-MCNC: 3.1 G/DL — LOW (ref 3.3–5)
ALP SERPL-CCNC: 37 U/L — LOW (ref 40–120)
ALT FLD-CCNC: 70 U/L — HIGH (ref 10–45)
ANION GAP SERPL CALC-SCNC: 14 MMOL/L — SIGNIFICANT CHANGE UP (ref 5–17)
AST SERPL-CCNC: 46 U/L — HIGH (ref 10–40)
BILIRUB SERPL-MCNC: 0.3 MG/DL — SIGNIFICANT CHANGE UP (ref 0.2–1.2)
BUN SERPL-MCNC: 13 MG/DL — SIGNIFICANT CHANGE UP (ref 7–23)
CALCIUM SERPL-MCNC: 8.2 MG/DL — LOW (ref 8.4–10.5)
CHLORIDE SERPL-SCNC: 102 MMOL/L — SIGNIFICANT CHANGE UP (ref 96–108)
CO2 SERPL-SCNC: 18 MMOL/L — LOW (ref 22–31)
CREAT SERPL-MCNC: 0.64 MG/DL — SIGNIFICANT CHANGE UP (ref 0.5–1.3)
FERRITIN SERPL-MCNC: 963 NG/ML — HIGH (ref 30–400)
GLUCOSE SERPL-MCNC: 97 MG/DL — SIGNIFICANT CHANGE UP (ref 70–99)
HCT VFR BLD CALC: 35.6 % — LOW (ref 39–50)
HGB BLD-MCNC: 11.5 G/DL — LOW (ref 13–17)
MCHC RBC-ENTMCNC: 27 PG — SIGNIFICANT CHANGE UP (ref 27–34)
MCHC RBC-ENTMCNC: 32.3 GM/DL — SIGNIFICANT CHANGE UP (ref 32–36)
MCV RBC AUTO: 83.6 FL — SIGNIFICANT CHANGE UP (ref 80–100)
NRBC # BLD: 0 /100 WBCS — SIGNIFICANT CHANGE UP (ref 0–0)
PLATELET # BLD AUTO: 304 K/UL — SIGNIFICANT CHANGE UP (ref 150–400)
POTASSIUM SERPL-MCNC: 4.5 MMOL/L — SIGNIFICANT CHANGE UP (ref 3.5–5.3)
POTASSIUM SERPL-SCNC: 4.5 MMOL/L — SIGNIFICANT CHANGE UP (ref 3.5–5.3)
PROT SERPL-MCNC: 7.4 G/DL — SIGNIFICANT CHANGE UP (ref 6–8.3)
RBC # BLD: 4.26 M/UL — SIGNIFICANT CHANGE UP (ref 4.2–5.8)
RBC # FLD: 15.8 % — HIGH (ref 10.3–14.5)
SODIUM SERPL-SCNC: 134 MMOL/L — LOW (ref 135–145)
WBC # BLD: 3.63 K/UL — LOW (ref 3.8–10.5)
WBC # FLD AUTO: 3.63 K/UL — LOW (ref 3.8–10.5)

## 2021-02-05 PROCEDURE — 99233 SBSQ HOSP IP/OBS HIGH 50: CPT

## 2021-02-05 RX ORDER — REMDESIVIR 5 MG/ML
100 INJECTION INTRAVENOUS EVERY 24 HOURS
Refills: 0 | Status: COMPLETED | OUTPATIENT
Start: 2021-02-06 | End: 2021-02-09

## 2021-02-05 RX ADMIN — REMDESIVIR 500 MILLIGRAM(S): 5 INJECTION INTRAVENOUS at 11:04

## 2021-02-05 RX ADMIN — Medication 6 MILLIGRAM(S): at 05:23

## 2021-02-05 RX ADMIN — MERCAPTOPURINE 75 MILLIGRAM(S): 50 TABLET ORAL at 11:04

## 2021-02-05 RX ADMIN — ENOXAPARIN SODIUM 40 MILLIGRAM(S): 100 INJECTION SUBCUTANEOUS at 11:04

## 2021-02-05 NOTE — PROGRESS NOTE ADULT - PROBLEM SELECTOR PLAN 1
-Cont Dexamethasone & Remdesivir  -monitor COVID marker if no improvement  - monitor LFTs and Cr  -Symptom management  -DVT PPx

## 2021-02-05 NOTE — PROGRESS NOTE ADULT - SUBJECTIVE AND OBJECTIVE BOX
[facility] Division of Hospital Medicine  Rosa Cruz MD  Pager (M-F, 8A-5P): [phone]  Other Times:  [phone]    Patient is a 35y old  Male who presents with a chief complaint of SOB (04 Feb 2021 20:44)      SUBJECTIVE / OVERNIGHT EVENTS:  feeling ok. still weak and cough. malaise. poor appetite. afebrile.   sating mid 90s on RA. STEWARD    ADDITIONAL REVIEW OF SYSTEMS: otherwise neg    MEDICATIONS  (STANDING):  dexAMETHasone     Tablet 6 milliGRAM(s) Oral daily  enoxaparin Injectable 40 milliGRAM(s) SubCutaneous daily  influenza   Vaccine 0.5 milliLiter(s) IntraMuscular once  mercaptopurine 75 milliGRAM(s) Oral daily  remdesivir  IVPB   IV Intermittent     MEDICATIONS  (PRN):  acetaminophen   Tablet .. 650 milliGRAM(s) Oral every 6 hours PRN Temp greater or equal to 38C (100.4F), Mild Pain (1 - 3), Moderate Pain (4 - 6), Severe Pain (7 - 10)  ALBUTerol    90 MICROgram(s) HFA Inhaler 2 Puff(s) Inhalation every 6 hours PRN Shortness of Breath and/or Wheezing  benzonatate 100 milliGRAM(s) Oral three times a day PRN Cough      CAPILLARY BLOOD GLUCOSE        I&O's Summary    04 Feb 2021 07:01  -  05 Feb 2021 07:00  --------------------------------------------------------  IN: 0 mL / OUT: 400 mL / NET: -400 mL    05 Feb 2021 07:01  -  05 Feb 2021 13:32  --------------------------------------------------------  IN: 0 mL / OUT: 650 mL / NET: -650 mL        PHYSICAL EXAM:  Vital Signs Last 24 Hrs  T(C): 36.8 (05 Feb 2021 12:21), Max: 39.6 (04 Feb 2021 13:38)  T(F): 98.2 (05 Feb 2021 12:21), Max: 103.2 (04 Feb 2021 13:38)  HR: 83 (05 Feb 2021 12:21) (80 - 125)  BP: 105/73 (05 Feb 2021 12:21) (97/65 - 115/77)  BP(mean): 87 (04 Feb 2021 20:10) (87 - 87)  RR: 18 (05 Feb 2021 12:21) (18 - 32)  SpO2: 97% (05 Feb 2021 12:21) (89% - 100%)    CONSTITUTIONAL: NAD, well-groomed  EYES:  conjunctiva and sclera clear  ENMT: Moist oral mucosa  NECK: Supple, no palpable masses; no JVD  RESPIRATORY: Normal respiratory effort; lungs are clear to auscultation bilaterally  CARDIOVASCULAR: Regular rate and rhythm, normal S1 and S2, no murmur/rub/gallop; No lower extremity edema  ABDOMEN: Nontender to palpation, normoactive bowel sounds, no rebound/guarding  MUSCULOSKELETAL:  no clubbing or cyanosis of digits; no joint swelling or tenderness to palpation  PSYCH: A+O to person, place, and time; affect appropriate  SKIN: No rashes; no palpable lesions    LABS:                        11.5   3.63  )-----------( 304      ( 05 Feb 2021 07:03 )             35.6     02-05    134<L>  |  102  |  13  ----------------------------<  97  4.5   |  18<L>  |  0.64    Ca    8.2<L>      05 Feb 2021 06:58    TPro  7.4  /  Alb  3.1<L>  /  TBili  0.3  /  DBili  x   /  AST  46<H>  /  ALT  70<H>  /  AlkPhos  37<L>  02-05    PT/INR - ( 04 Feb 2021 13:46 )   PT: 14.7 sec;   INR: 1.24 ratio         PTT - ( 04 Feb 2021 13:46 )  PTT:29.3 sec            RADIOLOGY & ADDITIONAL TESTS:  Results Reviewed:   Imaging Personally Reviewed:  Electrocardiogram Personally Reviewed:    COORDINATION OF CARE:  Care Discussed with Consultants/Other Providers [Y/N]:  Prior or Outpatient Records Reviewed [Y/N]:

## 2021-02-06 LAB
ALBUMIN SERPL ELPH-MCNC: 3.5 G/DL — SIGNIFICANT CHANGE UP (ref 3.3–5)
ALP SERPL-CCNC: 39 U/L — LOW (ref 40–120)
ALT FLD-CCNC: 75 U/L — HIGH (ref 10–45)
ANION GAP SERPL CALC-SCNC: 12 MMOL/L — SIGNIFICANT CHANGE UP (ref 5–17)
AST SERPL-CCNC: 44 U/L — HIGH (ref 10–40)
BILIRUB SERPL-MCNC: 0.3 MG/DL — SIGNIFICANT CHANGE UP (ref 0.2–1.2)
BUN SERPL-MCNC: 16 MG/DL — SIGNIFICANT CHANGE UP (ref 7–23)
CALCIUM SERPL-MCNC: 8.7 MG/DL — SIGNIFICANT CHANGE UP (ref 8.4–10.5)
CHLORIDE SERPL-SCNC: 103 MMOL/L — SIGNIFICANT CHANGE UP (ref 96–108)
CO2 SERPL-SCNC: 23 MMOL/L — SIGNIFICANT CHANGE UP (ref 22–31)
CREAT SERPL-MCNC: 0.74 MG/DL — SIGNIFICANT CHANGE UP (ref 0.5–1.3)
CRP SERPL-MCNC: 4.61 MG/DL — HIGH (ref 0–0.4)
D DIMER BLD IA.RAPID-MCNC: 294 NG/ML DDU — HIGH
FERRITIN SERPL-MCNC: 732 NG/ML — HIGH (ref 30–400)
GLUCOSE SERPL-MCNC: 98 MG/DL — SIGNIFICANT CHANGE UP (ref 70–99)
HCT VFR BLD CALC: 39.5 % — SIGNIFICANT CHANGE UP (ref 39–50)
HGB BLD-MCNC: 12.1 G/DL — LOW (ref 13–17)
MCHC RBC-ENTMCNC: 26.2 PG — LOW (ref 27–34)
MCHC RBC-ENTMCNC: 30.6 GM/DL — LOW (ref 32–36)
MCV RBC AUTO: 85.7 FL — SIGNIFICANT CHANGE UP (ref 80–100)
NRBC # BLD: 0 /100 WBCS — SIGNIFICANT CHANGE UP (ref 0–0)
PLATELET # BLD AUTO: 363 K/UL — SIGNIFICANT CHANGE UP (ref 150–400)
POTASSIUM SERPL-MCNC: 4.7 MMOL/L — SIGNIFICANT CHANGE UP (ref 3.5–5.3)
POTASSIUM SERPL-SCNC: 4.7 MMOL/L — SIGNIFICANT CHANGE UP (ref 3.5–5.3)
PROT SERPL-MCNC: 8.4 G/DL — HIGH (ref 6–8.3)
RBC # BLD: 4.61 M/UL — SIGNIFICANT CHANGE UP (ref 4.2–5.8)
RBC # FLD: 15.8 % — HIGH (ref 10.3–14.5)
SODIUM SERPL-SCNC: 138 MMOL/L — SIGNIFICANT CHANGE UP (ref 135–145)
WBC # BLD: 3.71 K/UL — LOW (ref 3.8–10.5)
WBC # FLD AUTO: 3.71 K/UL — LOW (ref 3.8–10.5)

## 2021-02-06 PROCEDURE — 99233 SBSQ HOSP IP/OBS HIGH 50: CPT

## 2021-02-06 RX ORDER — METOCLOPRAMIDE HCL 10 MG
10 TABLET ORAL ONCE
Refills: 0 | Status: COMPLETED | OUTPATIENT
Start: 2021-02-06 | End: 2021-02-06

## 2021-02-06 RX ADMIN — ENOXAPARIN SODIUM 40 MILLIGRAM(S): 100 INJECTION SUBCUTANEOUS at 11:09

## 2021-02-06 RX ADMIN — MERCAPTOPURINE 75 MILLIGRAM(S): 50 TABLET ORAL at 11:08

## 2021-02-06 RX ADMIN — Medication 6 MILLIGRAM(S): at 04:37

## 2021-02-06 RX ADMIN — Medication 10 MILLIGRAM(S): at 23:00

## 2021-02-06 RX ADMIN — REMDESIVIR 500 MILLIGRAM(S): 5 INJECTION INTRAVENOUS at 11:08

## 2021-02-06 NOTE — PROGRESS NOTE ADULT - SUBJECTIVE AND OBJECTIVE BOX
[facility] Division of Hospital Medicine  Jonnathan Petit MD  Pager (SHANNON-AZUCENA, 8A-5P): [phone]  Other Times:  [phone]    Patient is a 35y old  Male who presents with a chief complaint of SOB (06 Feb 2021 14:01)      SUBJECTIVE / OVERNIGHT EVENTS:    Overnight, no significant acute event reported.     VS: afebrile 69, 100/71, on RA    Patient was examined this morning. He is comfortable on room air while resting in bed, however feels fatigues, and gets short of breath when he gets up and walks. He denies headache, fever, dizziness, chest pain, n/v,     ADDITIONAL REVIEW OF SYSTEMS:    MEDICATIONS  (STANDING):  dexAMETHasone     Tablet 6 milliGRAM(s) Oral daily  enoxaparin Injectable 40 milliGRAM(s) SubCutaneous daily  influenza   Vaccine 0.5 milliLiter(s) IntraMuscular once  mercaptopurine 75 milliGRAM(s) Oral daily  remdesivir  IVPB 100 milliGRAM(s) IV Intermittent every 24 hours  remdesivir  IVPB   IV Intermittent     MEDICATIONS  (PRN):  acetaminophen   Tablet .. 650 milliGRAM(s) Oral every 6 hours PRN Temp greater or equal to 38C (100.4F), Mild Pain (1 - 3), Moderate Pain (4 - 6), Severe Pain (7 - 10)  ALBUTerol    90 MICROgram(s) HFA Inhaler 2 Puff(s) Inhalation every 6 hours PRN Shortness of Breath and/or Wheezing  benzonatate 100 milliGRAM(s) Oral three times a day PRN Cough      CAPILLARY BLOOD GLUCOSE        I&O's Summary    05 Feb 2021 07:01  -  06 Feb 2021 07:00  --------------------------------------------------------  IN: 0 mL / OUT: 1550 mL / NET: -1550 mL    06 Feb 2021 07:01  -  06 Feb 2021 15:27  --------------------------------------------------------  IN: 600 mL / OUT: 0 mL / NET: 600 mL        PHYSICAL EXAM:  Vital Signs Last 24 Hrs  T(C): 37.1 (06 Feb 2021 13:01), Max: 37.3 (05 Feb 2021 20:10)  T(F): 98.8 (06 Feb 2021 13:01), Max: 99.2 (05 Feb 2021 20:10)  HR: 87 (06 Feb 2021 13:01) (69 - 90)  BP: 99/62 (06 Feb 2021 13:01) (90/62 - 120/69)  BP(mean): --  RR: 20 (06 Feb 2021 13:01) (20 - 26)  SpO2: 93% (06 Feb 2021 13:01) (92% - 100%)  CONSTITUTIONAL: NAD, well-developed, well-groomed  EYES: PERRLA; conjunctiva and sclera clear  ENMT: Moist oral mucosa, no pharyngeal injection or exudates; normal dentition  NECK: Supple, no palpable masses; no thyromegaly  RESPIRATORY: Normal respiratory effort; lungs are clear to auscultation bilaterally  CARDIOVASCULAR: Regular rate and rhythm, normal S1 and S2, no murmur/rub/gallop; No lower extremity edema; Peripheral pulses are 2+ bilaterally  ABDOMEN: Nontender to palpation, normoactive bowel sounds, no rebound/guarding; No hepatosplenomegaly  MUSCULOSKELETAL:  Normal gait; no clubbing or cyanosis of digits; no joint swelling or tenderness to palpation  PSYCH: A+O to person, place, and time; affect appropriate  NEUROLOGY: CN 2-12 are intact and symmetric; no gross sensory deficits   SKIN: No rashes; no palpable lesions    LABS:                        12.1   3.71  )-----------( 363      ( 06 Feb 2021 06:42 )             39.5     02-06    138  |  103  |  16  ----------------------------<  98  4.7   |  23  |  0.74    Ca    8.7      06 Feb 2021 06:42    TPro  8.4<H>  /  Alb  3.5  /  TBili  0.3  /  DBili  x   /  AST  44<H>  /  ALT  75<H>  /  AlkPhos  39<L>  02-06                RADIOLOGY & ADDITIONAL TESTS:  Results Reviewed:   Imaging Personally Reviewed:  Electrocardiogram Personally Reviewed:    COORDINATION OF CARE:  Care Discussed with Consultants/Other Providers [Y/N]:  Prior or Outpatient Records Reviewed [Y/N]:   [facility] Division of Hospital Medicine  Jonnathan Petit MD  Pager (SHANNON-AZUCENA, 8A-5P): [phone]  Other Times:  [phone]    Patient is a 35y old  Male who presents with a chief complaint of SOB (06 Feb 2021 14:01)      SUBJECTIVE / OVERNIGHT EVENTS:    Overnight, no significant acute event reported.     VS: afebrile 69, 100/71, on RA    Patient was examined this morning. He is comfortable on room air while resting in bed, however feels fatigues, and gets short of breath when he gets up and walks. Continues to have intermittient cough - controlled. He denies headache, fever, dizziness, chest pain, n/v, abdominal pain, dysuria, diarrhea, pain/numbness/tingling in extremities.     ADDITIONAL REVIEW OF SYSTEMS: neg    MEDICATIONS  (STANDING):  dexAMETHasone     Tablet 6 milliGRAM(s) Oral daily  enoxaparin Injectable 40 milliGRAM(s) SubCutaneous daily  influenza   Vaccine 0.5 milliLiter(s) IntraMuscular once  mercaptopurine 75 milliGRAM(s) Oral daily  remdesivir  IVPB 100 milliGRAM(s) IV Intermittent every 24 hours  remdesivir  IVPB   IV Intermittent     MEDICATIONS  (PRN):  acetaminophen   Tablet .. 650 milliGRAM(s) Oral every 6 hours PRN Temp greater or equal to 38C (100.4F), Mild Pain (1 - 3), Moderate Pain (4 - 6), Severe Pain (7 - 10)  ALBUTerol    90 MICROgram(s) HFA Inhaler 2 Puff(s) Inhalation every 6 hours PRN Shortness of Breath and/or Wheezing  benzonatate 100 milliGRAM(s) Oral three times a day PRN Cough      CAPILLARY BLOOD GLUCOSE        I&O's Summary    05 Feb 2021 07:01  -  06 Feb 2021 07:00  --------------------------------------------------------  IN: 0 mL / OUT: 1550 mL / NET: -1550 mL    06 Feb 2021 07:01  -  06 Feb 2021 15:27  --------------------------------------------------------  IN: 600 mL / OUT: 0 mL / NET: 600 mL        PHYSICAL EXAM:  Vital Signs Last 24 Hrs  T(C): 37.1 (06 Feb 2021 13:01), Max: 37.3 (05 Feb 2021 20:10)  T(F): 98.8 (06 Feb 2021 13:01), Max: 99.2 (05 Feb 2021 20:10)  HR: 87 (06 Feb 2021 13:01) (69 - 90)  BP: 99/62 (06 Feb 2021 13:01) (90/62 - 120/69)  BP(mean): --  RR: 20 (06 Feb 2021 13:01) (20 - 26)  SpO2: 93% (06 Feb 2021 13:01) (92% - 100%)      CONSTITUTIONAL: NAD, appears fatigued  EYES:  conjunctiva and sclera clear  ENMT: Moist oral mucosa  NECK: Supple, no palpable masses; no JVD  RESPIRATORY: Normal respiratory effort; lungs are clear to auscultation bilaterally  CARDIOVASCULAR: Regular rate and rhythm, normal S1 and S2, no murmur/rub/gallop; No lower extremity edema  ABDOMEN: Nontender to palpation, normoactive bowel sounds, no rebound/guarding  MUSCULOSKELETAL:  no clubbing or cyanosis of digits; no joint swelling or tenderness to palpation  PSYCH: A+O to person, place, and time; affect appropriate  SKIN: No rashes; no palpable lesions      LABS:                        12.1   3.71  )-----------( 363      ( 06 Feb 2021 06:42 )             39.5     02-06    138  |  103  |  16  ----------------------------<  98  4.7   |  23  |  0.74    Ca    8.7      06 Feb 2021 06:42    TPro  8.4<H>  /  Alb  3.5  /  TBili  0.3  /  DBili  x   /  AST  44<H>  /  ALT  75<H>  /  AlkPhos  39<L>  02-06                RADIOLOGY & ADDITIONAL TESTS:  Results Reviewed:   Imaging Personally Reviewed:  Electrocardiogram Personally Reviewed:    COORDINATION OF CARE:  Care Discussed with Consultants/Other Providers [Y/N]:  Prior or Outpatient Records Reviewed [Y/N]:

## 2021-02-06 NOTE — PROGRESS NOTE ADULT - PROBLEM SELECTOR PLAN 1
-Cont Dexamethasone & Remdesivir  - monitor COVID marker if no improvement  - monitor LFTs and Cr  - Symptom management  -DVT PPx Lovenox

## 2021-02-07 LAB
ALBUMIN SERPL ELPH-MCNC: 3.2 G/DL — LOW (ref 3.3–5)
ALP SERPL-CCNC: 37 U/L — LOW (ref 40–120)
ALT FLD-CCNC: 89 U/L — HIGH (ref 10–45)
ANION GAP SERPL CALC-SCNC: 11 MMOL/L — SIGNIFICANT CHANGE UP (ref 5–17)
AST SERPL-CCNC: 44 U/L — HIGH (ref 10–40)
BASOPHILS # BLD AUTO: 0 K/UL — SIGNIFICANT CHANGE UP (ref 0–0.2)
BASOPHILS NFR BLD AUTO: 0 % — SIGNIFICANT CHANGE UP (ref 0–2)
BILIRUB SERPL-MCNC: 0.4 MG/DL — SIGNIFICANT CHANGE UP (ref 0.2–1.2)
BUN SERPL-MCNC: 16 MG/DL — SIGNIFICANT CHANGE UP (ref 7–23)
CALCIUM SERPL-MCNC: 8.7 MG/DL — SIGNIFICANT CHANGE UP (ref 8.4–10.5)
CHLORIDE SERPL-SCNC: 103 MMOL/L — SIGNIFICANT CHANGE UP (ref 96–108)
CO2 SERPL-SCNC: 23 MMOL/L — SIGNIFICANT CHANGE UP (ref 22–31)
CREAT SERPL-MCNC: 0.78 MG/DL — SIGNIFICANT CHANGE UP (ref 0.5–1.3)
EOSINOPHIL # BLD AUTO: 0.01 K/UL — SIGNIFICANT CHANGE UP (ref 0–0.5)
EOSINOPHIL NFR BLD AUTO: 0.2 % — SIGNIFICANT CHANGE UP (ref 0–6)
GLUCOSE SERPL-MCNC: 92 MG/DL — SIGNIFICANT CHANGE UP (ref 70–99)
HCT VFR BLD CALC: 38.1 % — LOW (ref 39–50)
HGB BLD-MCNC: 12.2 G/DL — LOW (ref 13–17)
IMM GRANULOCYTES NFR BLD AUTO: 0.9 % — SIGNIFICANT CHANGE UP (ref 0–1.5)
LYMPHOCYTES # BLD AUTO: 1.67 K/UL — SIGNIFICANT CHANGE UP (ref 1–3.3)
LYMPHOCYTES # BLD AUTO: 28.7 % — SIGNIFICANT CHANGE UP (ref 13–44)
MCHC RBC-ENTMCNC: 27.3 PG — SIGNIFICANT CHANGE UP (ref 27–34)
MCHC RBC-ENTMCNC: 32 GM/DL — SIGNIFICANT CHANGE UP (ref 32–36)
MCV RBC AUTO: 85.2 FL — SIGNIFICANT CHANGE UP (ref 80–100)
MONOCYTES # BLD AUTO: 0.2 K/UL — SIGNIFICANT CHANGE UP (ref 0–0.9)
MONOCYTES NFR BLD AUTO: 3.4 % — SIGNIFICANT CHANGE UP (ref 2–14)
NEUTROPHILS # BLD AUTO: 3.89 K/UL — SIGNIFICANT CHANGE UP (ref 1.8–7.4)
NEUTROPHILS NFR BLD AUTO: 66.8 % — SIGNIFICANT CHANGE UP (ref 43–77)
NRBC # BLD: 0 /100 WBCS — SIGNIFICANT CHANGE UP (ref 0–0)
PLATELET # BLD AUTO: 354 K/UL — SIGNIFICANT CHANGE UP (ref 150–400)
POTASSIUM SERPL-MCNC: 4.4 MMOL/L — SIGNIFICANT CHANGE UP (ref 3.5–5.3)
POTASSIUM SERPL-SCNC: 4.4 MMOL/L — SIGNIFICANT CHANGE UP (ref 3.5–5.3)
PROT SERPL-MCNC: 7.9 G/DL — SIGNIFICANT CHANGE UP (ref 6–8.3)
RBC # BLD: 4.47 M/UL — SIGNIFICANT CHANGE UP (ref 4.2–5.8)
RBC # FLD: 15.4 % — HIGH (ref 10.3–14.5)
SARS-COV-2 IGG SERPL QL IA: POSITIVE
SARS-COV-2 IGM SERPL IA-ACNC: 11.6 INDEX — HIGH
SODIUM SERPL-SCNC: 137 MMOL/L — SIGNIFICANT CHANGE UP (ref 135–145)
WBC # BLD: 5.82 K/UL — SIGNIFICANT CHANGE UP (ref 3.8–10.5)
WBC # FLD AUTO: 5.82 K/UL — SIGNIFICANT CHANGE UP (ref 3.8–10.5)

## 2021-02-07 PROCEDURE — 99233 SBSQ HOSP IP/OBS HIGH 50: CPT

## 2021-02-07 RX ORDER — CALCIUM CARBONATE 500(1250)
1 TABLET ORAL EVERY 8 HOURS
Refills: 0 | Status: DISCONTINUED | OUTPATIENT
Start: 2021-02-07 | End: 2021-02-09

## 2021-02-07 RX ORDER — FAMOTIDINE 10 MG/ML
20 INJECTION INTRAVENOUS
Refills: 0 | Status: DISCONTINUED | OUTPATIENT
Start: 2021-02-07 | End: 2021-02-09

## 2021-02-07 RX ORDER — CALCIUM CARBONATE 500(1250)
1 TABLET ORAL ONCE
Refills: 0 | Status: COMPLETED | OUTPATIENT
Start: 2021-02-07 | End: 2021-02-07

## 2021-02-07 RX ADMIN — Medication 6 MILLIGRAM(S): at 05:18

## 2021-02-07 RX ADMIN — ENOXAPARIN SODIUM 40 MILLIGRAM(S): 100 INJECTION SUBCUTANEOUS at 11:15

## 2021-02-07 RX ADMIN — MERCAPTOPURINE 75 MILLIGRAM(S): 50 TABLET ORAL at 12:52

## 2021-02-07 RX ADMIN — FAMOTIDINE 20 MILLIGRAM(S): 10 INJECTION INTRAVENOUS at 16:46

## 2021-02-07 RX ADMIN — REMDESIVIR 500 MILLIGRAM(S): 5 INJECTION INTRAVENOUS at 11:15

## 2021-02-07 RX ADMIN — Medication 1 TABLET(S): at 11:20

## 2021-02-07 NOTE — PROGRESS NOTE ADULT - SUBJECTIVE AND OBJECTIVE BOX
[facility] Division of Hospital Medicine  Jonnathan Petit MD  Pager (JANAK, 5Q-7T): [phone]  Other Times:  [phone]    Patient is a 35y old  Male who presents with a chief complaint of SOB (07 Feb 2021 13:50)      SUBJECTIVE / OVERNIGHT EVENTS:    Overnight, no significant acute event reported.     VS: afebrile, 81, 96/66, on RA    Patient was examined this morning. He was feeling short of breath at rest and had been placed on NC per nursing. Satting 92% on 3 L NC. He feels more exhausted than yesterday. Has been having hiccups and experienced heart burn earlier today. Denies headache, dizziness, fever, chills, chest pain, abdominal pain, N/V, dysuria, diarrhea, pain/numbness/tingling in extremities.     ADDITIONAL REVIEW OF SYSTEMS: neg    MEDICATIONS  (STANDING):  dexAMETHasone     Tablet 6 milliGRAM(s) Oral daily  enoxaparin Injectable 40 milliGRAM(s) SubCutaneous daily  famotidine    Tablet 20 milliGRAM(s) Oral two times a day  influenza   Vaccine 0.5 milliLiter(s) IntraMuscular once  mercaptopurine 75 milliGRAM(s) Oral daily  remdesivir  IVPB 100 milliGRAM(s) IV Intermittent every 24 hours  remdesivir  IVPB   IV Intermittent     MEDICATIONS  (PRN):  acetaminophen   Tablet .. 650 milliGRAM(s) Oral every 6 hours PRN Temp greater or equal to 38C (100.4F), Mild Pain (1 - 3), Moderate Pain (4 - 6), Severe Pain (7 - 10)  ALBUTerol    90 MICROgram(s) HFA Inhaler 2 Puff(s) Inhalation every 6 hours PRN Shortness of Breath and/or Wheezing  benzonatate 100 milliGRAM(s) Oral three times a day PRN Cough  calcium carbonate    500 mG (Tums) Chewable 1 Tablet(s) Chew every 8 hours PRN Heartburn      CAPILLARY BLOOD GLUCOSE        I&O's Summary    06 Feb 2021 07:01  -  07 Feb 2021 07:00  --------------------------------------------------------  IN: 1080 mL / OUT: 800 mL / NET: 280 mL    07 Feb 2021 07:01  -  07 Feb 2021 13:53  --------------------------------------------------------  IN: 250 mL / OUT: 400 mL / NET: -150 mL        PHYSICAL EXAM:  Vital Signs Last 24 Hrs  T(C): 36.3 (07 Feb 2021 09:26), Max: 36.7 (06 Feb 2021 21:19)  T(F): 97.4 (07 Feb 2021 09:26), Max: 98 (06 Feb 2021 21:19)  HR: 82 (07 Feb 2021 09:26) (73 - 82)  BP: 94/67 (07 Feb 2021 09:26) (94/67 - 104/74)  BP(mean): --  RR: 20 (07 Feb 2021 09:26) (20 - 22)  SpO2: 92% (07 Feb 2021 09:26) (75% - 100%)      CONSTITUTIONAL: NAD, appears fatigued  EYES:  conjunctiva and sclera clear  ENMT: Moist oral mucosa  NECK: Supple, no palpable masses; no JVD  RESPIRATORY: lungs are clear to auscultation bilaterally, no wheeze/crackle   CARDIOVASCULAR: Regular rate and rhythm, normal S1 and S2, no murmur/rub/gallop; No lower extremity edema  ABDOMEN: Nontender to palpation, normoactive bowel sounds, no rebound/guarding  MUSCULOSKELETAL:  no clubbing or cyanosis of digits; no joint swelling or tenderness to palpation  PSYCH: A+O to person, place, and time; affect appropriate  SKIN: No rashes; no palpable lesions        LABS:                        12.2   5.82  )-----------( 354      ( 07 Feb 2021 05:36 )             38.1     02-07    137  |  103  |  16  ----------------------------<  92  4.4   |  23  |  0.78    Ca    8.7      07 Feb 2021 05:36    TPro  7.9  /  Alb  3.2<L>  /  TBili  0.4  /  DBili  x   /  AST  44<H>  /  ALT  89<H>  /  AlkPhos  37<L>  02-07                RADIOLOGY & ADDITIONAL TESTS:  Results Reviewed:   Imaging Personally Reviewed:  Electrocardiogram Personally Reviewed:    COORDINATION OF CARE:  Care Discussed with Consultants/Other Providers [Y/N]:  Prior or Outpatient Records Reviewed [Y/N]:

## 2021-02-07 NOTE — PROGRESS NOTE ADULT - PROBLEM SELECTOR PLAN 1
- Continue Dexamethasone & Remdesivir per protocol  - monitor inflammatory markers   - monitor LFTs and Cr  - Symptom management  - DVT PPx Lovenox  - Start Pepcid BID WBC improved 5.8  Ferritin 963>732  CRP 8>4.6  D dimer 424>294    - Continue Dexamethasone & Remdesivir per protocol  - monitor inflammatory markers   - monitor LFTs and Cr  - Symptom management  - DVT PPx Lovenox  - Start Pepcid BID

## 2021-02-08 ENCOUNTER — TRANSCRIPTION ENCOUNTER (OUTPATIENT)
Age: 36
End: 2021-02-08

## 2021-02-08 LAB
ALBUMIN SERPL ELPH-MCNC: 3.4 G/DL — SIGNIFICANT CHANGE UP (ref 3.3–5)
ALP SERPL-CCNC: 43 U/L — SIGNIFICANT CHANGE UP (ref 40–120)
ALT FLD-CCNC: 112 U/L — HIGH (ref 10–45)
ANION GAP SERPL CALC-SCNC: 11 MMOL/L — SIGNIFICANT CHANGE UP (ref 5–17)
AST SERPL-CCNC: 46 U/L — HIGH (ref 10–40)
BILIRUB SERPL-MCNC: 0.6 MG/DL — SIGNIFICANT CHANGE UP (ref 0.2–1.2)
BUN SERPL-MCNC: 18 MG/DL — SIGNIFICANT CHANGE UP (ref 7–23)
CALCIUM SERPL-MCNC: 8.6 MG/DL — SIGNIFICANT CHANGE UP (ref 8.4–10.5)
CHLORIDE SERPL-SCNC: 101 MMOL/L — SIGNIFICANT CHANGE UP (ref 96–108)
CO2 SERPL-SCNC: 24 MMOL/L — SIGNIFICANT CHANGE UP (ref 22–31)
CREAT SERPL-MCNC: 0.69 MG/DL — SIGNIFICANT CHANGE UP (ref 0.5–1.3)
CRP SERPL-MCNC: 3.35 MG/DL — HIGH (ref 0–0.4)
D DIMER BLD IA.RAPID-MCNC: 596 NG/ML DDU — HIGH
FERRITIN SERPL-MCNC: 693 NG/ML — HIGH (ref 30–400)
GLUCOSE SERPL-MCNC: 92 MG/DL — SIGNIFICANT CHANGE UP (ref 70–99)
POTASSIUM SERPL-MCNC: 4.4 MMOL/L — SIGNIFICANT CHANGE UP (ref 3.5–5.3)
POTASSIUM SERPL-SCNC: 4.4 MMOL/L — SIGNIFICANT CHANGE UP (ref 3.5–5.3)
PROT SERPL-MCNC: 8.3 G/DL — SIGNIFICANT CHANGE UP (ref 6–8.3)
SODIUM SERPL-SCNC: 136 MMOL/L — SIGNIFICANT CHANGE UP (ref 135–145)

## 2021-02-08 PROCEDURE — 99232 SBSQ HOSP IP/OBS MODERATE 35: CPT

## 2021-02-08 RX ADMIN — FAMOTIDINE 20 MILLIGRAM(S): 10 INJECTION INTRAVENOUS at 17:33

## 2021-02-08 RX ADMIN — REMDESIVIR 500 MILLIGRAM(S): 5 INJECTION INTRAVENOUS at 10:17

## 2021-02-08 RX ADMIN — ENOXAPARIN SODIUM 40 MILLIGRAM(S): 100 INJECTION SUBCUTANEOUS at 10:17

## 2021-02-08 RX ADMIN — Medication 6 MILLIGRAM(S): at 05:40

## 2021-02-08 RX ADMIN — MERCAPTOPURINE 75 MILLIGRAM(S): 50 TABLET ORAL at 10:18

## 2021-02-08 RX ADMIN — FAMOTIDINE 20 MILLIGRAM(S): 10 INJECTION INTRAVENOUS at 05:40

## 2021-02-08 NOTE — DISCHARGE NOTE PROVIDER - NSFOLLOWUPCLINICS_GEN_ALL_ED_FT
[facility] General Internal Medicine  General Internal Medicine  [address] [id number]  Phone: [phone]  Fax:   Follow Up Time:

## 2021-02-08 NOTE — PROGRESS NOTE ADULT - PROBLEM SELECTOR PLAN 1
cliinically improving.   Would cont with Remdesivir and decadron 5/10days respectively.   monitor LFTs, Cr.

## 2021-02-08 NOTE — DISCHARGE NOTE PROVIDER - HOSPITAL COURSE
35M with PMHx of ulcerative colitis and prior DVT (10+ years prior) presents with 13 day history of fever, backache, cough, and dyspnea on exertion for two weeks. Patient suffering from acute hypoxic respiratory failure secondary to COVID-19. Pt. is doing better and tolerating well of oxygen . Pt. completed remdesivir and is transitioned to oral steroids for discharge      35M with PMHx of ulcerative colitis and prior DVT (10+ years prior) presents with 13 day history of fever, backache, cough, and dyspnea on exertion   for two weeks. Patient suffering from acute hypoxic respiratory failure secondary to COVID-19. Pt. is doing better and tolerating well of oxygen .   Pt. completed remdesivir and is transitioned to oral steroids for discharge      35M with PMHx of ulcerative colitis and prior DVT (10+ years prior) presents with 13 day history of fever, backache, cough, and dyspnea on exertion   for two weeks. Patient suffering from acute hypoxic respiratory failure secondary to COVID-19. Pt. is doing better and tolerating well of oxygen .   Pt. completed remdesivir and is transitioned to oral steroids for discharge   Cleared for discharge on 2/9/21.

## 2021-02-08 NOTE — DISCHARGE NOTE PROVIDER - NSDCCPCAREPLAN_GEN_ALL_CORE_FT
PRINCIPAL DISCHARGE DIAGNOSIS  Diagnosis: COVID-19 virus infection  Assessment and Plan of Treatment: You tested positive for COVID 19.  You no longer require hospitalization.  Please restrict activities outside of your home except for getting medical care.  Do not go to work, school, or public areas.  Avoid using public transportation, ride-sharing, or taxis.  Separate yourself from other people and animals in your home.  Call ahead before visiting your doctor.  Wear a facemask when you are around other people. Cover your cough and sneezes.  Clean your hands often.  Avoid sharing personal household items.  Clean all frequently touched surfaces daily.]        SECONDARY DISCHARGE DIAGNOSES  Diagnosis: Hypoxia  Assessment and Plan of Treatment:     Diagnosis: Ulcerative colitis  Assessment and Plan of Treatment: Ulcerative colitis    Diagnosis: Transaminitis  Assessment and Plan of Treatment: Transaminitis     PRINCIPAL DISCHARGE DIAGNOSIS  Diagnosis: COVID-19 virus infection  Assessment and Plan of Treatment: s/p remdesivir.  Finish 4 more days of decadron.  Follow up with your PCP.  You tested positive for COVID 19.  You no longer require hospitalization.  Please restrict activities outside of your home except for getting medical care.  Do not go to work, school, or public areas.  Avoid using public transportation, ride-sharing, or taxis.  Separate yourself from other people and animals in your home.  Call ahead before visiting your doctor.  Wear a facemask when you are around other people. Cover your cough and sneezes.  Clean your hands often.  Avoid sharing personal household items.  Clean all frequently touched surfaces daily.]        SECONDARY DISCHARGE DIAGNOSES  Diagnosis: Hypoxia  Assessment and Plan of Treatment: You are now stable on room air.  Monitor your oxygen saturatio at home using pulse oximetry.      Diagnosis: Ulcerative colitis  Assessment and Plan of Treatment: Follow up with your gastroenterologist.    Diagnosis: Transaminitis  Assessment and Plan of Treatment: Follow up with your PCP as an outpatient to monitor your liver function tests.     PRINCIPAL DISCHARGE DIAGNOSIS  Diagnosis: COVID-19 virus infection  Assessment and Plan of Treatment: s/p remdesivir.  Finish 4 more days of decadron.  Follow up with your PCP.  You tested positive for COVID 19.  You no longer require hospitalization.  Please restrict activities outside of your home except for getting medical care.  Do not go to work, school, or public areas.  Avoid using public transportation, ride-sharing, or taxis.  Separate yourself from other people and animals in your home.  Call ahead before visiting your doctor.  Wear a facemask when you are around other people. Cover your cough and sneezes.  Clean your hands often.  Avoid sharing personal household items.  Clean all frequently touched surfaces daily.]        SECONDARY DISCHARGE DIAGNOSES  Diagnosis: Hypoxia  Assessment and Plan of Treatment: You are now stable on room air.  Monitor your oxygen saturatio at home using pulse oximetry.      Diagnosis: Ulcerative colitis  Assessment and Plan of Treatment: Follow up with your gastroenterologist.    Diagnosis: Transaminitis  Assessment and Plan of Treatment: Follow up with your PCP as an outpatient to monitor your liver function blood test.

## 2021-02-08 NOTE — DISCHARGE NOTE PROVIDER - NSDCMRMEDTOKEN_GEN_ALL_CORE_FT
mercaptopurine: 75 milligram(s) orally once a day   acetaminophen 325 mg oral tablet: 2 tab(s) orally every 6 hours, As needed, Temp greater or equal to 38C (100.4F), Mild Pain (1 - 3), Moderate Pain (4 - 6), Severe Pain (7 - 10)  albuterol 90 mcg/inh inhalation aerosol: 2 puff(s) inhaled every 6 hours, As needed, Shortness of Breath and/or Wheezing  benzonatate 100 mg oral capsule: 1 cap(s) orally 3 times a day, As needed, Cough  calcium carbonate 500 mg (200 mg elemental calcium) oral tablet, chewable: 1 tab(s) orally every 8 hours, As needed, Heartburn  dexamethasone 6 mg oral tablet: 1 tab(s) orally once a day  famotidine 20 mg oral tablet: 1 tab(s) orally 2 times a day  mercaptopurine: 75 milligram(s) orally once a day

## 2021-02-08 NOTE — PROGRESS NOTE ADULT - SUBJECTIVE AND OBJECTIVE BOX
[facility] Division of Hospital Medicine  Rosa Curz MD  Pager (M-F, 8A-5P): [phone]  Other Times:  [phone]    Patient is a 35y old  Male who presents with a chief complaint of SOB (07 Feb 2021 13:50)      SUBJECTIVE / OVERNIGHT EVENTS:  feeling better. still with some STEWARD. afebrile.  no acute overnight issues.       ADDITIONAL REVIEW OF SYSTEMS: otherwise neg    MEDICATIONS  (STANDING):  dexAMETHasone     Tablet 6 milliGRAM(s) Oral daily  enoxaparin Injectable 40 milliGRAM(s) SubCutaneous daily  famotidine    Tablet 20 milliGRAM(s) Oral two times a day  influenza   Vaccine 0.5 milliLiter(s) IntraMuscular once  mercaptopurine 75 milliGRAM(s) Oral daily  remdesivir  IVPB 100 milliGRAM(s) IV Intermittent every 24 hours  remdesivir  IVPB   IV Intermittent     MEDICATIONS  (PRN):  acetaminophen   Tablet .. 650 milliGRAM(s) Oral every 6 hours PRN Temp greater or equal to 38C (100.4F), Mild Pain (1 - 3), Moderate Pain (4 - 6), Severe Pain (7 - 10)  ALBUTerol    90 MICROgram(s) HFA Inhaler 2 Puff(s) Inhalation every 6 hours PRN Shortness of Breath and/or Wheezing  benzonatate 100 milliGRAM(s) Oral three times a day PRN Cough  calcium carbonate    500 mG (Tums) Chewable 1 Tablet(s) Chew every 8 hours PRN Heartburn      CAPILLARY BLOOD GLUCOSE        I&O's Summary    07 Feb 2021 07:01  -  08 Feb 2021 07:00  --------------------------------------------------------  IN: 610 mL / OUT: 1200 mL / NET: -590 mL        PHYSICAL EXAM:  Vital Signs Last 24 Hrs  T(C): 36.7 (08 Feb 2021 04:30), Max: 37 (07 Feb 2021 18:19)  T(F): 98 (08 Feb 2021 04:30), Max: 98.6 (07 Feb 2021 18:19)  HR: 83 (08 Feb 2021 04:30) (81 - 85)  BP: 99/68 (08 Feb 2021 04:30) (91/55 - 100/72)  BP(mean): --  RR: 18 (08 Feb 2021 04:30) (18 - 18)  SpO2: 99% (08 Feb 2021 04:30) (93% - 99%)    CONSTITUTIONAL: NAD, well-groomed  EYES:  conjunctiva and sclera clear  ENMT: Moist oral mucosa  NECK: Supple, no palpable masses; no JVD  RESPIRATORY: Normal respiratory effort; lungs are clear to auscultation bilaterally  CARDIOVASCULAR: Regular rate and rhythm, normal S1 and S2, no murmur/rub/gallop; No lower extremity edema  ABDOMEN: Nontender to palpation, normoactive bowel sounds, no rebound/guarding  MUSCULOSKELETAL:  no clubbing or cyanosis of digits; no joint swelling or tenderness to palpation  PSYCH: A+O to person, place, and time; affect appropriate  SKIN: No rashes; no palpable lesions    LABS:                        12.2   5.82  )-----------( 354      ( 07 Feb 2021 05:36 )             38.1     02-08    136  |  101  |  18  ----------------------------<  92  4.4   |  24  |  0.69    Ca    8.6      08 Feb 2021 06:26    TPro  8.3  /  Alb  3.4  /  TBili  0.6  /  DBili  x   /  AST  46<H>  /  ALT  112<H>  /  AlkPhos  43  02-08                RADIOLOGY & ADDITIONAL TESTS:  Results Reviewed:   Imaging Personally Reviewed:  Electrocardiogram Personally Reviewed:    COORDINATION OF CARE:  Care Discussed with Consultants/Other Providers [Y/N]:  Prior or Outpatient Records Reviewed [Y/N]:

## 2021-02-09 ENCOUNTER — TRANSCRIPTION ENCOUNTER (OUTPATIENT)
Age: 36
End: 2021-02-09

## 2021-02-09 VITALS
DIASTOLIC BLOOD PRESSURE: 62 MMHG | HEART RATE: 95 BPM | OXYGEN SATURATION: 95 % | TEMPERATURE: 99 F | SYSTOLIC BLOOD PRESSURE: 101 MMHG | RESPIRATION RATE: 18 BRPM

## 2021-02-09 LAB
ALBUMIN SERPL ELPH-MCNC: 3.3 G/DL — SIGNIFICANT CHANGE UP (ref 3.3–5)
ALP SERPL-CCNC: 42 U/L — SIGNIFICANT CHANGE UP (ref 40–120)
ALT FLD-CCNC: 88 U/L — HIGH (ref 10–45)
ANION GAP SERPL CALC-SCNC: 12 MMOL/L — SIGNIFICANT CHANGE UP (ref 5–17)
AST SERPL-CCNC: 31 U/L — SIGNIFICANT CHANGE UP (ref 10–40)
BILIRUB DIRECT SERPL-MCNC: 0.1 MG/DL — SIGNIFICANT CHANGE UP (ref 0–0.2)
BILIRUB INDIRECT FLD-MCNC: 0.4 MG/DL — SIGNIFICANT CHANGE UP (ref 0.2–1)
BILIRUB SERPL-MCNC: 0.5 MG/DL — SIGNIFICANT CHANGE UP (ref 0.2–1.2)
BUN SERPL-MCNC: 19 MG/DL — SIGNIFICANT CHANGE UP (ref 7–23)
CALCIUM SERPL-MCNC: 8.8 MG/DL — SIGNIFICANT CHANGE UP (ref 8.4–10.5)
CHLORIDE SERPL-SCNC: 105 MMOL/L — SIGNIFICANT CHANGE UP (ref 96–108)
CO2 SERPL-SCNC: 23 MMOL/L — SIGNIFICANT CHANGE UP (ref 22–31)
CREAT SERPL-MCNC: 0.71 MG/DL — SIGNIFICANT CHANGE UP (ref 0.5–1.3)
GLUCOSE SERPL-MCNC: 86 MG/DL — SIGNIFICANT CHANGE UP (ref 70–99)
HCT VFR BLD CALC: 39.5 % — SIGNIFICANT CHANGE UP (ref 39–50)
HGB BLD-MCNC: 12.5 G/DL — LOW (ref 13–17)
MCHC RBC-ENTMCNC: 26.8 PG — LOW (ref 27–34)
MCHC RBC-ENTMCNC: 31.6 GM/DL — LOW (ref 32–36)
MCV RBC AUTO: 84.8 FL — SIGNIFICANT CHANGE UP (ref 80–100)
NRBC # BLD: 0 /100 WBCS — SIGNIFICANT CHANGE UP (ref 0–0)
PLATELET # BLD AUTO: 241 K/UL — SIGNIFICANT CHANGE UP (ref 150–400)
POTASSIUM SERPL-MCNC: 4.4 MMOL/L — SIGNIFICANT CHANGE UP (ref 3.5–5.3)
POTASSIUM SERPL-SCNC: 4.4 MMOL/L — SIGNIFICANT CHANGE UP (ref 3.5–5.3)
PROT SERPL-MCNC: 7.7 G/DL — SIGNIFICANT CHANGE UP (ref 6–8.3)
RBC # BLD: 4.66 M/UL — SIGNIFICANT CHANGE UP (ref 4.2–5.8)
RBC # FLD: 15.1 % — HIGH (ref 10.3–14.5)
SODIUM SERPL-SCNC: 140 MMOL/L — SIGNIFICANT CHANGE UP (ref 135–145)
WBC # BLD: 4.42 K/UL — SIGNIFICANT CHANGE UP (ref 3.8–10.5)
WBC # FLD AUTO: 4.42 K/UL — SIGNIFICANT CHANGE UP (ref 3.8–10.5)

## 2021-02-09 PROCEDURE — 83605 ASSAY OF LACTIC ACID: CPT

## 2021-02-09 PROCEDURE — 87635 SARS-COV-2 COVID-19 AMP PRB: CPT

## 2021-02-09 PROCEDURE — 96361 HYDRATE IV INFUSION ADD-ON: CPT

## 2021-02-09 PROCEDURE — 74177 CT ABD & PELVIS W/CONTRAST: CPT

## 2021-02-09 PROCEDURE — 80053 COMPREHEN METABOLIC PANEL: CPT

## 2021-02-09 PROCEDURE — 99239 HOSP IP/OBS DSCHRG MGMT >30: CPT

## 2021-02-09 PROCEDURE — 82803 BLOOD GASES ANY COMBINATION: CPT

## 2021-02-09 PROCEDURE — 85018 HEMOGLOBIN: CPT

## 2021-02-09 PROCEDURE — 99285 EMERGENCY DEPT VISIT HI MDM: CPT | Mod: 25

## 2021-02-09 PROCEDURE — U0005: CPT

## 2021-02-09 PROCEDURE — 86140 C-REACTIVE PROTEIN: CPT

## 2021-02-09 PROCEDURE — 84484 ASSAY OF TROPONIN QUANT: CPT

## 2021-02-09 PROCEDURE — 80076 HEPATIC FUNCTION PANEL: CPT

## 2021-02-09 PROCEDURE — 96374 THER/PROPH/DIAG INJ IV PUSH: CPT

## 2021-02-09 PROCEDURE — 93005 ELECTROCARDIOGRAM TRACING: CPT

## 2021-02-09 PROCEDURE — 85025 COMPLETE CBC W/AUTO DIFF WBC: CPT

## 2021-02-09 PROCEDURE — 82728 ASSAY OF FERRITIN: CPT

## 2021-02-09 PROCEDURE — 84145 PROCALCITONIN (PCT): CPT

## 2021-02-09 PROCEDURE — 86769 SARS-COV-2 COVID-19 ANTIBODY: CPT

## 2021-02-09 PROCEDURE — 85014 HEMATOCRIT: CPT

## 2021-02-09 PROCEDURE — 83690 ASSAY OF LIPASE: CPT

## 2021-02-09 PROCEDURE — 85610 PROTHROMBIN TIME: CPT

## 2021-02-09 PROCEDURE — 84295 ASSAY OF SERUM SODIUM: CPT

## 2021-02-09 PROCEDURE — 71045 X-RAY EXAM CHEST 1 VIEW: CPT

## 2021-02-09 PROCEDURE — 96375 TX/PRO/DX INJ NEW DRUG ADDON: CPT

## 2021-02-09 PROCEDURE — 82565 ASSAY OF CREATININE: CPT

## 2021-02-09 PROCEDURE — 82947 ASSAY GLUCOSE BLOOD QUANT: CPT

## 2021-02-09 PROCEDURE — 85379 FIBRIN DEGRADATION QUANT: CPT

## 2021-02-09 PROCEDURE — 85027 COMPLETE CBC AUTOMATED: CPT

## 2021-02-09 PROCEDURE — 82330 ASSAY OF CALCIUM: CPT

## 2021-02-09 PROCEDURE — 80048 BASIC METABOLIC PNL TOTAL CA: CPT

## 2021-02-09 PROCEDURE — 82435 ASSAY OF BLOOD CHLORIDE: CPT

## 2021-02-09 PROCEDURE — 84132 ASSAY OF SERUM POTASSIUM: CPT

## 2021-02-09 PROCEDURE — 85730 THROMBOPLASTIN TIME PARTIAL: CPT

## 2021-02-09 PROCEDURE — 71275 CT ANGIOGRAPHY CHEST: CPT

## 2021-02-09 RX ORDER — FAMOTIDINE 10 MG/ML
1 INJECTION INTRAVENOUS
Qty: 60 | Refills: 0
Start: 2021-02-09 | End: 2021-03-10

## 2021-02-09 RX ORDER — ALBUTEROL 90 UG/1
2 AEROSOL, METERED ORAL
Qty: 1 | Refills: 0
Start: 2021-02-09 | End: 2021-03-10

## 2021-02-09 RX ORDER — ACETAMINOPHEN 500 MG
2 TABLET ORAL
Qty: 0 | Refills: 0 | DISCHARGE
Start: 2021-02-09

## 2021-02-09 RX ORDER — DEXAMETHASONE 0.5 MG/5ML
1 ELIXIR ORAL
Qty: 4 | Refills: 0
Start: 2021-02-09 | End: 2021-02-12

## 2021-02-09 RX ORDER — CALCIUM CARBONATE 500(1250)
1 TABLET ORAL
Qty: 0 | Refills: 0 | DISCHARGE
Start: 2021-02-09

## 2021-02-09 RX ADMIN — Medication 6 MILLIGRAM(S): at 05:40

## 2021-02-09 RX ADMIN — REMDESIVIR 500 MILLIGRAM(S): 5 INJECTION INTRAVENOUS at 09:52

## 2021-02-09 NOTE — PROGRESS NOTE ADULT - PROBLEM SELECTOR PLAN 4
Continue with mercaptopurine

## 2021-02-09 NOTE — PROGRESS NOTE ADULT - PROBLEM SELECTOR PROBLEM 1
COVID-19 virus infection

## 2021-02-09 NOTE — CHART NOTE - NSCHARTNOTEFT_GEN_A_CORE
[facility] Discharge Note    Patient medically cleared for discharge today, discussed with Dr. Cruz.  Medications for discharge confirmed with Dr Cruz. Per attending, discharge   on no DVT ppx. Patient is ambulating w/o difficulty.  Discharge plan discussed with patient.    Natasha Leroy PA-C  Dept of Medicine  #[id number]

## 2021-02-09 NOTE — DISCHARGE NOTE NURSING/CASE MANAGEMENT/SOCIAL WORK - PATIENT PORTAL LINK FT
You can access the FollowMyHealth Patient Portal offered by [facility] by registering at the following website: [email or web address]. By joining [employer]’s FollowMyHealth portal, you will also be able to view your health information using other applications (apps) compatible with our system.

## 2021-02-09 NOTE — PROGRESS NOTE ADULT - ASSESSMENT
35M with PMHx of ulcerative colitis and prior DVT (10+ years prior) presents with 13 day history of fever, backache, cough, and dyspnea on exertion for two weeks. Patient suffering from acute hypoxic respiratory failure secondary to COVID-19.

## 2021-02-09 NOTE — PROGRESS NOTE ADULT - PROBLEM SELECTOR PLAN 2
Currently on RA sating 90s. still very dyspneic with minimal exertion.   -Incentive spirometer  -CTA neg.
cont to monitor for hypoxia and oxygen requirement.   currrently maintaining low to mid 90s on RA.   check on O2 with ambulation.
improved oxygenation. rest and ambulation above 92%
-CTA neg  Patient is now 3 L NC  Feels dyspneic at rest    -Continue oxygen supplementation as needed, keep sats >92%  -Incentive spirometer
Currently on RA sating 90s. still very dyspneic with minimal exertion.   -Incentive spirometer  -CTA neg.

## 2021-02-09 NOTE — PROGRESS NOTE ADULT - ATTENDING COMMENTS
d/c  home today  d/c time 40mins
d/c planning pending O2 and completion of meds.
Case and plan discussed with ACP: Audelia
Case and plan discussed with ACP: Shannan

## 2021-02-10 ENCOUNTER — TRANSCRIPTION ENCOUNTER (OUTPATIENT)
Age: 36
End: 2021-02-10

## 2021-02-18 ENCOUNTER — EMERGENCY (EMERGENCY)
Facility: HOSPITAL | Age: 36
LOS: 1 days | Discharge: HOME | End: 2021-02-18
Attending: EMERGENCY MEDICINE
Payer: COMMERCIAL

## 2021-02-18 VITALS
WEIGHT: 139.99 LBS | HEIGHT: 68 IN | TEMPERATURE: 99 F | SYSTOLIC BLOOD PRESSURE: 110 MMHG | DIASTOLIC BLOOD PRESSURE: 71 MMHG | HEART RATE: 135 BPM | OXYGEN SATURATION: 99 % | RESPIRATION RATE: 16 BRPM

## 2021-02-18 VITALS
SYSTOLIC BLOOD PRESSURE: 109 MMHG | OXYGEN SATURATION: 99 % | DIASTOLIC BLOOD PRESSURE: 70 MMHG | RESPIRATION RATE: 17 BRPM | HEART RATE: 103 BPM

## 2021-02-18 PROBLEM — K51.90 ULCERATIVE COLITIS, UNSPECIFIED, WITHOUT COMPLICATIONS: Chronic | Status: ACTIVE | Noted: 2021-02-04

## 2021-02-18 LAB
ALBUMIN SERPL ELPH-MCNC: 3.5 G/DL — SIGNIFICANT CHANGE UP (ref 3.3–5)
ALP SERPL-CCNC: 53 U/L — SIGNIFICANT CHANGE UP (ref 40–120)
ALT FLD-CCNC: 46 U/L — HIGH (ref 10–45)
ANION GAP SERPL CALC-SCNC: 13 MMOL/L — SIGNIFICANT CHANGE UP (ref 5–17)
APTT BLD: 31.8 SEC — SIGNIFICANT CHANGE UP (ref 27.5–35.5)
AST SERPL-CCNC: 19 U/L — SIGNIFICANT CHANGE UP (ref 10–40)
BASOPHILS # BLD AUTO: 0.06 K/UL — SIGNIFICANT CHANGE UP (ref 0–0.2)
BASOPHILS NFR BLD AUTO: 0.7 % — SIGNIFICANT CHANGE UP (ref 0–2)
BILIRUB SERPL-MCNC: 0.2 MG/DL — SIGNIFICANT CHANGE UP (ref 0.2–1.2)
BUN SERPL-MCNC: 13 MG/DL — SIGNIFICANT CHANGE UP (ref 7–23)
CALCIUM SERPL-MCNC: 9 MG/DL — SIGNIFICANT CHANGE UP (ref 8.4–10.5)
CHLORIDE SERPL-SCNC: 103 MMOL/L — SIGNIFICANT CHANGE UP (ref 96–108)
CO2 SERPL-SCNC: 23 MMOL/L — SIGNIFICANT CHANGE UP (ref 22–31)
CREAT SERPL-MCNC: 0.68 MG/DL — SIGNIFICANT CHANGE UP (ref 0.5–1.3)
CRP SERPL-MCNC: 0.48 MG/DL — HIGH (ref 0–0.4)
D DIMER BLD IA.RAPID-MCNC: 3129 NG/ML DDU — HIGH
EOSINOPHIL # BLD AUTO: 0.08 K/UL — SIGNIFICANT CHANGE UP (ref 0–0.5)
EOSINOPHIL NFR BLD AUTO: 1 % — SIGNIFICANT CHANGE UP (ref 0–6)
GLUCOSE SERPL-MCNC: 103 MG/DL — HIGH (ref 70–99)
HCT VFR BLD CALC: 40.7 % — SIGNIFICANT CHANGE UP (ref 39–50)
HGB BLD-MCNC: 12.9 G/DL — LOW (ref 13–17)
IMM GRANULOCYTES NFR BLD AUTO: 2.6 % — HIGH (ref 0–1.5)
INR BLD: 1 RATIO — SIGNIFICANT CHANGE UP (ref 0.88–1.16)
LYMPHOCYTES # BLD AUTO: 2.43 K/UL — SIGNIFICANT CHANGE UP (ref 1–3.3)
LYMPHOCYTES # BLD AUTO: 30.3 % — SIGNIFICANT CHANGE UP (ref 13–44)
MCHC RBC-ENTMCNC: 28.5 PG — SIGNIFICANT CHANGE UP (ref 27–34)
MCHC RBC-ENTMCNC: 31.7 GM/DL — LOW (ref 32–36)
MCV RBC AUTO: 90 FL — SIGNIFICANT CHANGE UP (ref 80–100)
MONOCYTES # BLD AUTO: 0.73 K/UL — SIGNIFICANT CHANGE UP (ref 0–0.9)
MONOCYTES NFR BLD AUTO: 9.1 % — SIGNIFICANT CHANGE UP (ref 2–14)
NEUTROPHILS # BLD AUTO: 4.51 K/UL — SIGNIFICANT CHANGE UP (ref 1.8–7.4)
NEUTROPHILS NFR BLD AUTO: 56.3 % — SIGNIFICANT CHANGE UP (ref 43–77)
NRBC # BLD: 0 /100 WBCS — SIGNIFICANT CHANGE UP (ref 0–0)
NT-PROBNP SERPL-SCNC: 28 PG/ML — SIGNIFICANT CHANGE UP (ref 0–300)
PLATELET # BLD AUTO: 246 K/UL — SIGNIFICANT CHANGE UP (ref 150–400)
POTASSIUM SERPL-MCNC: 3.8 MMOL/L — SIGNIFICANT CHANGE UP (ref 3.5–5.3)
POTASSIUM SERPL-SCNC: 3.8 MMOL/L — SIGNIFICANT CHANGE UP (ref 3.5–5.3)
PROCALCITONIN SERPL-MCNC: 0.04 NG/ML — SIGNIFICANT CHANGE UP (ref 0.02–0.1)
PROT SERPL-MCNC: 7.2 G/DL — SIGNIFICANT CHANGE UP (ref 6–8.3)
PROTHROM AB SERPL-ACNC: 12 SEC — SIGNIFICANT CHANGE UP (ref 10.6–13.6)
RBC # BLD: 4.52 M/UL — SIGNIFICANT CHANGE UP (ref 4.2–5.8)
RBC # FLD: 17.9 % — HIGH (ref 10.3–14.5)
SARS-COV-2 RNA SPEC QL NAA+PROBE: DETECTED
SODIUM SERPL-SCNC: 139 MMOL/L — SIGNIFICANT CHANGE UP (ref 135–145)
TROPONIN T, HIGH SENSITIVITY RESULT: 7 NG/L — SIGNIFICANT CHANGE UP (ref 0–51)
WBC # BLD: 8.02 K/UL — SIGNIFICANT CHANGE UP (ref 3.8–10.5)
WBC # FLD AUTO: 8.02 K/UL — SIGNIFICANT CHANGE UP (ref 3.8–10.5)

## 2021-02-18 PROCEDURE — 85610 PROTHROMBIN TIME: CPT

## 2021-02-18 PROCEDURE — 80053 COMPREHEN METABOLIC PANEL: CPT

## 2021-02-18 PROCEDURE — 71045 X-RAY EXAM CHEST 1 VIEW: CPT

## 2021-02-18 PROCEDURE — 71045 X-RAY EXAM CHEST 1 VIEW: CPT | Mod: 26

## 2021-02-18 PROCEDURE — 83880 ASSAY OF NATRIURETIC PEPTIDE: CPT

## 2021-02-18 PROCEDURE — 82728 ASSAY OF FERRITIN: CPT

## 2021-02-18 PROCEDURE — 86140 C-REACTIVE PROTEIN: CPT

## 2021-02-18 PROCEDURE — 99285 EMERGENCY DEPT VISIT HI MDM: CPT

## 2021-02-18 PROCEDURE — G1004: CPT

## 2021-02-18 PROCEDURE — 99285 EMERGENCY DEPT VISIT HI MDM: CPT | Mod: 25

## 2021-02-18 PROCEDURE — 85730 THROMBOPLASTIN TIME PARTIAL: CPT

## 2021-02-18 PROCEDURE — 71275 CT ANGIOGRAPHY CHEST: CPT

## 2021-02-18 PROCEDURE — 84484 ASSAY OF TROPONIN QUANT: CPT

## 2021-02-18 PROCEDURE — 84145 PROCALCITONIN (PCT): CPT

## 2021-02-18 PROCEDURE — U0005: CPT

## 2021-02-18 PROCEDURE — 93971 EXTREMITY STUDY: CPT | Mod: 26

## 2021-02-18 PROCEDURE — 85025 COMPLETE CBC W/AUTO DIFF WBC: CPT

## 2021-02-18 PROCEDURE — U0003: CPT

## 2021-02-18 PROCEDURE — 93971 EXTREMITY STUDY: CPT

## 2021-02-18 PROCEDURE — 71275 CT ANGIOGRAPHY CHEST: CPT | Mod: 26,MG

## 2021-02-18 PROCEDURE — 85379 FIBRIN DEGRADATION QUANT: CPT

## 2021-02-18 PROCEDURE — 93005 ELECTROCARDIOGRAM TRACING: CPT

## 2021-02-18 RX ORDER — RIVAROXABAN 15 MG-20MG
15 KIT ORAL ONCE
Refills: 0 | Status: COMPLETED | OUTPATIENT
Start: 2021-02-18 | End: 2021-02-18

## 2021-02-18 RX ORDER — SODIUM CHLORIDE 9 MG/ML
1000 INJECTION INTRAMUSCULAR; INTRAVENOUS; SUBCUTANEOUS ONCE
Refills: 0 | Status: COMPLETED | OUTPATIENT
Start: 2021-02-18 | End: 2021-02-18

## 2021-02-18 RX ORDER — RIVAROXABAN 15 MG-20MG
1 KIT ORAL
Qty: 42 | Refills: 0
Start: 2021-02-18 | End: 2021-03-10

## 2021-02-18 RX ADMIN — SODIUM CHLORIDE 1000 MILLILITER(S): 9 INJECTION INTRAMUSCULAR; INTRAVENOUS; SUBCUTANEOUS at 21:02

## 2021-02-18 RX ADMIN — RIVAROXABAN 15 MILLIGRAM(S): KIT at 21:02

## 2021-02-18 NOTE — ED PROVIDER NOTE - CLINICAL SUMMARY MEDICAL DECISION MAKING FREE TEXT BOX
34 yo male with pmhx UC (on 6MP) DVT/PE 10 years ago, COVID (dc'ed from hospital 9 days ago), presenting with LLE swelling/pain. concern for DVT/ PE, will evaluate with US LLE doppler, CTa chest, ekg, cbc, cmp bnp, will reassess. if localized clot, possible outpatient anticoagulation 36 yo male with pmhx UC (on 6MP) DVT/PE 10 years ago, COVID (dc'ed from hospital 9 days ago), presenting with LLE swelling/pain. concern for DVT/ PE, will evaluate with US LLE doppler, CTa chest, ekg, cbc, cmp bnp, will reassess. if localized clot, possible outpatient anticoagulation    CHAD Vega MD: Pt is a 36 y/o male with pmhx UC, DVT/PE 10 years ago, COVID (recent dc from hospital 9 days ago) who p/w c/o LLE swelling/pain. Pt recently dc'ed after being hospitalized with covid, for past 2 days has had LLE swelling and pain, worse with ambulation. Pt came to ED for further evaluation. Pt with some intermittent SOB since covid dx. Pt denies: chest pain, cough, fevers, chills, pleuritic chest pain, abdominal pain, n/v/d, back pain, neck pain, HA, neck stiffness, focal numbness or weakness, visual changes, dizziness, lightheadedness, recent travel, recent trauma, recent immobilization, dysuria, hematuria, rash. Ddx includes, however, is not limited to: DVT / PE, other. Plan: basic labs, trop, bnp, CTA chest, LE doppler

## 2021-02-18 NOTE — ED ADULT NURSE NOTE - OBJECTIVE STATEMENT
35 yr old M arrived to the ED form home c/o LLE swelling and redness. HX COVID positive on1/30. p states that x2 days his LLE has had notable swelling and tenderness to palpation. upon assessment pt is a&ox4, speaking clearly and in full sentences, answering questions and following commands. speech is clear. lungs clear daniel. respirations are even and unlabored. pt is noted to be tachycardic to 130s on cardiac monitor. abd is soft, non t terri. LLE has noted edema over medial aspect with mild overlying redness. Leg is tender to palpation. pt denies any worsening SOB, fever, chills, nausea, vomiting or chest pain

## 2021-02-18 NOTE — ED PROVIDER NOTE - CARE PLAN
Principal Discharge DX:	Acute deep vein thrombosis (DVT) of other specified vein of left lower extremity

## 2021-02-18 NOTE — ED PROVIDER NOTE - ATTENDING CONTRIBUTION TO CARE
I saw and evaluated patient with resident. I discussed H+P and MDM with resident. I agree with the statements made by the resident unless otherwise noted.    The care of this patient was in support of the [facility] countermeasures to Covid-19.

## 2021-02-18 NOTE — ED PROVIDER NOTE - CARE PROVIDER_API CALL
Sachin Leal)  Vascular Surgery  [address], [address]  [place] [address]  Phone: [phone]  Fax: [phone]  Established Patient  Follow Up Time: 1-3 Days

## 2021-02-18 NOTE — ED PROVIDER NOTE - PATIENT PORTAL LINK FT
You can access the FollowMyHealth Patient Portal offered by [facility] by registering at the following website: [email or web address]. By joining [employer]’s FollowMyHealth portal, you will also be able to view your health information using other applications (apps) compatible with our system.

## 2021-02-18 NOTE — ED PROVIDER NOTE - OBJECTIVE STATEMENT
34 yo male with pmhx UC (on 6MP) DVT/PE 10 years ago, COVID (dc'ed from hospital 9 days ago), presenting with LLE swelling/pain. Pt recently dc'ed after being hospitalized with covid, for past 2 days has had LLE and pain, worse with walking. Came to ED for further evaluation. In ED, tachycardic. Has SOB intermittently at baseline since covid dx.    Denies any CP, N/V/D, fevers, LOC

## 2021-02-18 NOTE — ED PROVIDER NOTE - NSPTACCESSSVCSAPPTDETAILS_ED_ALL_ED_FT
URGENT: please expedite appointment with Dr. Leal within 1 week. Mr. Douglas is an established patient. Thank you!

## 2021-02-18 NOTE — ED PROVIDER NOTE - PROGRESS NOTE DETAILS
Eric Reynolds, PGY2: CTA negative for PE. DVT study positive for venous occlusion from distal superficial femoral vein extending to the gastrocnemius. Results discussed w/ patient. HR noted to be 110s - patient reports his resting HR is always >100. Patient denies chest pain or shortness of breath. Will start on Xarelto for DVT and give IVF, recheck HR prior to dispo. Patient feels comfortable going home and having outpatient treatment. Eric Reynolds, PGY2: HR improved to 100. Patient remains without cp or sob. Discussed return precautions and all questions answered. Pt in agreement w/ plan. CAOx3, NAD, VSS. Stable for d/c.

## 2021-02-19 LAB — FERRITIN SERPL-MCNC: 126 NG/ML — SIGNIFICANT CHANGE UP (ref 30–400)

## 2021-02-22 ENCOUNTER — APPOINTMENT (OUTPATIENT)
Dept: VASCULAR SURGERY | Facility: CLINIC | Age: 36
End: 2021-02-22
Payer: COMMERCIAL

## 2021-02-22 VITALS
HEART RATE: 102 BPM | SYSTOLIC BLOOD PRESSURE: 117 MMHG | HEIGHT: 68 IN | BODY MASS INDEX: 21.98 KG/M2 | WEIGHT: 145 LBS | DIASTOLIC BLOOD PRESSURE: 77 MMHG

## 2021-02-22 DIAGNOSIS — I82.402 ACUTE EMBOLISM AND THROMBOSIS OF UNSPECIFIED DEEP VEINS OF LEFT LOWER EXTREMITY: ICD-10-CM

## 2021-02-22 DIAGNOSIS — I82.442 ACUTE EMBOLISM AND THROMBOSIS OF LEFT TIBIAL VEIN: ICD-10-CM

## 2021-02-22 DIAGNOSIS — I82.452 LT PERONEAL VEIN ACUTE EMBOLISM AND THROMBOSIS: ICD-10-CM

## 2021-02-22 DIAGNOSIS — I82.432 ACUTE EMBOLISM AND THROMBOSIS OF LEFT POPLITEAL VEIN: ICD-10-CM

## 2021-02-22 PROCEDURE — 93970 EXTREMITY STUDY: CPT

## 2021-02-22 PROCEDURE — 99204 OFFICE O/P NEW MOD 45 MIN: CPT

## 2021-02-22 PROCEDURE — 99072 ADDL SUPL MATRL&STAF TM PHE: CPT

## 2021-02-22 PROCEDURE — 93978 VASCULAR STUDY: CPT

## 2021-02-22 RX ORDER — MERCAPTOPURINE 50 MG/1
50 TABLET ORAL
Qty: 45 | Refills: 0 | Status: ACTIVE | COMMUNITY
Start: 2021-01-06

## 2021-02-22 RX ORDER — FAMOTIDINE 20 MG/1
20 TABLET, FILM COATED ORAL
Qty: 60 | Refills: 0 | Status: ACTIVE | COMMUNITY
Start: 2021-02-09

## 2021-02-22 RX ORDER — DEXAMETHASONE 6 MG/1
6 TABLET ORAL
Qty: 4 | Refills: 0 | Status: ACTIVE | COMMUNITY
Start: 2021-02-09

## 2021-02-22 RX ORDER — RIVAROXABAN 2.5 MG/1
TABLET, FILM COATED ORAL
Refills: 0 | Status: ACTIVE | COMMUNITY

## 2021-02-22 RX ORDER — ALBUTEROL SULFATE 90 UG/1
108 (90 BASE) INHALANT RESPIRATORY (INHALATION)
Qty: 6 | Refills: 0 | Status: ACTIVE | COMMUNITY
Start: 2021-02-09

## 2021-02-22 RX ORDER — BENZONATATE 100 MG/1
100 CAPSULE ORAL
Qty: 21 | Refills: 0 | Status: ACTIVE | COMMUNITY
Start: 2021-02-09

## 2021-02-22 RX ORDER — DOXYCYCLINE 100 MG/1
100 CAPSULE ORAL
Qty: 20 | Refills: 0 | Status: ACTIVE | COMMUNITY
Start: 2021-01-29

## 2021-02-22 NOTE — ASSESSMENT
[Arterial/Venous Disease] : arterial/venous disease [Medication Management] : medication management [Other: _____] : [unfilled] [FreeTextEntry1] : Impression symptomatic venous insuff w lle dvt , s/o hypercoag state \par \par \par Plan Med Conservative management leg elevation, knee high compression stockings 20-30mm Hg (rx given), wt loss, diet control, exercise program, protective measures\par advised pt to f/u w Dr Rodriguez  for re eval  for lifelong anticoag rx since this is a recurrence\par continue xarelto\par lle venous duplex in early may 2021 then telehealth\par rto prn \par \par \par \par Varicose veins are enlarged, twisted veins. Varicose veins are caused by increased blood pressure in the veins.  The blood moves towards the heart by 1-way valves in the veins. When the valves become weakened or damaged, blood can collect in the veins and pool in your lower legs (ankles). This causes the veins to become enlarged and incompetent with reflux. Sitting or standing for long periods can cause blood to pool in the leg veins, increasing the pressure within the veins. \par Risk factors for varicose veins or venous disease may include:  obesity, older age, standing or sitting for prolonged periods of time for several years, being female, pregnancy, taking oral contraceptive pills or hormone replacement, being inactive, and/or smoking. \par The most common symptoms of varicose veins are sensations in the legs, such as a heavy feeling, burning, and/or aching. However, each individual may experience symptoms differently.  Other symptoms may include:  color changes in the skin, sores on the legs, or rash.  Severe varicose veins or venous disease may eventually produce long-term mild swelling that can result in more serious skin and tissue problems, such as ulcers and non-healing sores.\par Varicose veins and venous disease are diagnosed by a complete medical history, physical examination, and diagnostic studies for varicose veins including duplex ultrasound and color-flow imaging.  \par Medical treatment for varicose veins and venous disease include:  compression stockings, sclerotherapy, endovenous ablation and/or surgical treatment with microphlebectomy.  \par \par

## 2021-02-22 NOTE — PHYSICAL EXAM
[Normal Breath Sounds] : Normal breath sounds [1+] : left 1+ [2+] : left 2+ [Ankle Swelling (On Exam)] : present [Ankle Swelling Bilaterally] : bilaterally  [Ankle Swelling On The Left] : moderate [Varicose Veins Of Lower Extremities] : bilaterally [] : bilaterally [Ankle Swelling On The Right] : mild [No HSM] : no hepatosplenomegaly [No Rash or Lesion] : No rash or lesion [Alert] : alert [Oriented to Person] : oriented to person [Oriented to Place] : oriented to place [Oriented to Time] : oriented to time [JVD] : no jugular venous distention  [Abdomen Masses] : No abdominal masses [Tender] : was nontender [Stool Sample Taken] : No stool obtained  on rectal exam [de-identified] : nad [de-identified] : wml [FreeTextEntry1] : Mild bilateral leg venous insufficiency \par w mild bilateral leg stasis dermatitis, hyperpigmentation in the gator area, hyperkeratosis (skin thickening)\par and moderate bilateral  leg edema \par Multiple  bilateral  leg small varicose  veins and spider veins  ant post medial calf and shin \par RLE Varicose veins measuring 1-2  mm in size on the calf /shin \par LLE Varicose veins measuring  1-2 mm in size on the calf /shin \par no wounds/ulcers\par \par \par  [de-identified] : wml [de-identified] : wml [de-identified] : Eriberto Cranial nerves 2-12 eriberto grossly intact [de-identified] : cooperative

## 2021-02-22 NOTE — DATA REVIEWED
[FreeTextEntry1] : 2/22/2021 Venous Doppler\par                            RLE an acute dvt svt  no sono evidence of reflux\par                            LLE acute and  sub acute occulsive   dvt in  popliteal vein, post tib vein, peroneal vein,                                       gastroc v, soleus v\par \par 2/22/2021 Abdominal Venous Duplex  patent IVC, Bilateral Iliac veins  w/o dvt or svt\par \par

## 2021-02-22 NOTE — HISTORY OF PRESENT ILLNESS
[FreeTextEntry1] : pt states recent covid dx and lle dvt\par pt qwas eval at  [facility]  and dx w lle dvt \par pt was r/o for PE\par pt is currently on xarelto starter pack\par pt is s/p LLE   thrombolysis and [facility] thrombectomy  by IR in  2010 and has not f/u since 2012\par pt is not compliant w comp stockings

## 2021-02-23 ENCOUNTER — TRANSCRIPTION ENCOUNTER (OUTPATIENT)
Age: 36
End: 2021-02-23

## 2021-03-03 ENCOUNTER — APPOINTMENT (OUTPATIENT)
Dept: VASCULAR SURGERY | Facility: CLINIC | Age: 36
End: 2021-03-03

## 2021-05-24 ENCOUNTER — APPOINTMENT (OUTPATIENT)
Dept: VASCULAR SURGERY | Facility: CLINIC | Age: 36
End: 2021-05-24
Payer: COMMERCIAL

## 2021-05-24 DIAGNOSIS — I87.2 VENOUS INSUFFICIENCY (CHRONIC) (PERIPHERAL): ICD-10-CM

## 2021-05-24 DIAGNOSIS — I83.893 VARICOSE VEINS OF BILATERAL LOWER EXTREMITIES WITH OTHER COMPLICATIONS: ICD-10-CM

## 2021-05-24 DIAGNOSIS — I87.009 POSTTHROMBOTIC SYNDROME W/OUT COMPLICATIONS OF UNSPECIFIED EXTREMITY: ICD-10-CM

## 2021-05-24 PROCEDURE — 99072 ADDL SUPL MATRL&STAF TM PHE: CPT

## 2021-05-24 PROCEDURE — 93971 EXTREMITY STUDY: CPT | Mod: LT

## 2021-05-26 ENCOUNTER — NON-APPOINTMENT (OUTPATIENT)
Age: 36
End: 2021-05-26

## 2021-05-26 ENCOUNTER — APPOINTMENT (OUTPATIENT)
Dept: VASCULAR SURGERY | Facility: CLINIC | Age: 36
End: 2021-05-26
Payer: COMMERCIAL

## 2021-05-26 PROBLEM — I87.009 POST-PHLEBITIC SYNDROME: Status: ACTIVE | Noted: 2021-05-26

## 2021-05-26 PROBLEM — I83.893 VARICOSE VEINS OF BILATERAL LOWER EXTREMITIES WITH OTHER COMPLICATIONS: Status: ACTIVE | Noted: 2021-02-22

## 2021-05-26 PROBLEM — I87.2 VENOUS INSUFFICIENCY OF BOTH LOWER EXTREMITIES: Status: ACTIVE | Noted: 2021-02-22

## 2021-05-26 PROCEDURE — 99213 OFFICE O/P EST LOW 20 MIN: CPT | Mod: 95

## 2021-05-26 NOTE — DATA REVIEWED
[FreeTextEntry1] : 2/22/2021 Venous Doppler\par                            RLE an acute dvt svt  no sono evidence of reflux\par                            LLE acute and  sub acute occulsive   dvt in  popliteal vein, post tib vein, peroneal vein,                                       gastroc v, soleus v\par \par 2/22/2021 Abdominal Venous Duplex  patent IVC, Bilateral Iliac veins  w/o dvt or svt\par \par 5/24/2021 LLE venous Duplex  no acute dvt svt prev dvt has resolved

## 2021-05-26 NOTE — ASSESSMENT
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 Varicose veins are caused by increased blood pressure in the veins.  The blood moves towards the heart by 1-way valves in the veins. When the valves become weakened or damaged, blood can collect in the veins and pool in your lower legs (ankles). This causes the veins to become enlarged and incompetent with reflux. Sitting or standing for long periods can cause blood to pool in the leg veins, increasing the pressure within the veins. \par Risk factors for varicose veins or venous disease may include:  obesity, older age, standing or sitting for prolonged periods of time for several years, being female, pregnancy, taking oral contraceptive pills or hormone replacement, being inactive, and/or smoking. \par The most common symptoms of varicose veins are sensations in the legs, such as a heavy feeling, burning, and/or aching. However, each individual may experience symptoms differently.  Other symptoms may include:  color changes in the skin, sores on the legs, or rash.  Severe varicose veins or venous disease may eventually produce long-term mild swelling that can result in more serious skin and tissue problems, such as ulcers and non-healing sores.\par Varicose veins and venous disease are diagnosed by a complete medical history, physical examination, and diagnostic studies for varicose veins including duplex ultrasound and color-flow imaging.  \par Medical treatment for varicose veins and venous disease include:  compression stockings, sclerotherapy, endovenous ablation and/or surgical treatment with microphlebectomy.  \par \par

## 2021-05-26 NOTE — PHYSICAL EXAM
[1+] : left 1+ [2+] : left 2+ [Ankle Swelling (On Exam)] : present [Ankle Swelling Bilaterally] : bilaterally  [Varicose Veins Of Lower Extremities] : bilaterally [] : bilaterally [No HSM] : no hepatosplenomegaly [No Rash or Lesion] : No rash or lesion [Alert] : alert [Oriented to Person] : oriented to person [Oriented to Place] : oriented to place [Oriented to Time] : oriented to time [Ankle Swelling On The Right] : mild [Abdomen Masses] : No abdominal masses [Tender] : was nontender [Stool Sample Taken] : No stool obtained  on rectal exam [de-identified] : nad [de-identified] : wml [de-identified] : no resp distress  [FreeTextEntry1] : Mild bilateral leg venous insufficiency \par w mild bilateral leg stasis dermatitis, hyperpigmentation in the gator area, hyperkeratosis (skin thickening)\par and mild bilateral  leg edema \par Multiple  bilateral  leg small varicose  veins and spider veins  ant post medial calf and shin \par RLE Varicose veins measuring 1-2  mm in size on the calf /shin \par LLE Varicose veins measuring  1-2 mm in size on the calf /shin \par no wounds/ulcers\par \par \par  [de-identified] : wml [de-identified] : wml [de-identified] : Eriberto Cranial nerves 2-12 eriberto grossly intact [de-identified] : cooperative

## 2021-05-26 NOTE — HISTORY OF PRESENT ILLNESS
[FreeTextEntry1] : pt states recent covid dx and lle dvt\par pt was eval at  [facility]  and dx w lle dvt \par pt was r/o for PE\par pt is currently on xarelto starter pack\par pt is s/p LLE   thrombolysis and [facility] thrombectomy  by IR in  2010 and has not f/u since 2012\par pt is not compliant w comp stockings  [de-identified] : pt is compliant w comp stockings\par pt states lle swelling has nearly resolved since last ov \par pt comleted hyper coag w/u w Dr Rodriguez and it was neg\par tel c/w Dr Rodriguez  pt was  positive for cardiolipin ab due to UC\par the ab levels have resolved since\par pt is currently on Xarelto\par

## 2021-05-26 NOTE — REASON FOR VISIT
[Home] : at home, [unfilled] , at the time of the visit. [Medical Office: ([facility])___] : at the medical office located in  [Other:____] : [unfilled] [Verbal consent obtained from patient] : the patient, [unfilled] [Follow-Up: _____] : a [unfilled] follow-up visit [FreeTextEntry1] : for previous left leg dvt

## 2021-05-26 NOTE — DISCUSSION/SUMMARY
[FreeTextEntry1] : Medical conserv management leg elevation, continue lle compress stockings rx indefinitely\par Continue coumadin rx and hematologic w/u as per Dr NONA Rodriguez\par ov w daniel leg v duplex s./o dvt dec 2013

## 2021-07-26 NOTE — PROGRESS NOTE ADULT - PROBLEM SELECTOR PLAN 3
Likely due to viral illness. Continue to monitor while especially in light of being on Remdesivir
Name band;

## 2021-10-05 NOTE — ED ADULT TRIAGE NOTE - ESI TRIAGE ACUITY LEVEL, MLM
----- Message from Charissa Leblanc MD sent at 10/4/2021  2:47 PM CDT -----  No abnormality on us. I recommend an MRI LLE, are of interest targeted  
Hello, please advise the patient had US done 10/1/2021. We have you as the resulting MD for this.    PCP is requesting a more specific recommendation for the MRI. Please see below message from her.  
It is a lower extremity MRI, however the reading radiologist didn't specify if angio is needed, therefore will have to ask for a specific recommendation in terms of MRI needed to address the clinical concern.   
MD Charissa Bess MD  The MRI should be of the thigh which is the area where the patient reportedly has a mass.  It does not have to an angiographic study.  MRI with and without Gadavist contrast will suffice.  I apologize that I don't know the exact study to order as I never place the orders.  A tib/fib study is too low.  Is there simply an \"extremity\" MRI that you could then put in the comments where to scan?             
3

## 2021-11-23 ENCOUNTER — EMERGENCY (EMERGENCY)
Facility: HOSPITAL | Age: 36
LOS: 1 days | Discharge: HOME | End: 2021-11-23
Attending: EMERGENCY MEDICINE
Payer: COMMERCIAL

## 2021-11-23 VITALS
OXYGEN SATURATION: 99 % | HEART RATE: 131 BPM | RESPIRATION RATE: 18 BRPM | DIASTOLIC BLOOD PRESSURE: 82 MMHG | WEIGHT: 164.91 LBS | TEMPERATURE: 99 F | SYSTOLIC BLOOD PRESSURE: 118 MMHG | HEIGHT: 68 IN

## 2021-11-23 LAB
ALBUMIN SERPL ELPH-MCNC: 3.7 G/DL — SIGNIFICANT CHANGE UP (ref 3.3–5)
ALP SERPL-CCNC: 81 U/L — SIGNIFICANT CHANGE UP (ref 40–120)
ALT FLD-CCNC: 14 U/L — SIGNIFICANT CHANGE UP (ref 10–45)
ANION GAP SERPL CALC-SCNC: 12 MMOL/L — SIGNIFICANT CHANGE UP (ref 5–17)
ANISOCYTOSIS BLD QL: SLIGHT — SIGNIFICANT CHANGE UP
APTT BLD: 27.8 SEC — SIGNIFICANT CHANGE UP (ref 27.5–35.5)
AST SERPL-CCNC: 10 U/L — SIGNIFICANT CHANGE UP (ref 10–40)
BASE EXCESS BLDV CALC-SCNC: 4 MMOL/L — HIGH (ref -2–2)
BASOPHILS # BLD AUTO: 0 K/UL — SIGNIFICANT CHANGE UP (ref 0–0.2)
BASOPHILS NFR BLD AUTO: 0 % — SIGNIFICANT CHANGE UP (ref 0–2)
BILIRUB SERPL-MCNC: 0.3 MG/DL — SIGNIFICANT CHANGE UP (ref 0.2–1.2)
BUN SERPL-MCNC: 10 MG/DL — SIGNIFICANT CHANGE UP (ref 7–23)
CA-I SERPL-SCNC: 1.13 MMOL/L — LOW (ref 1.15–1.33)
CALCIUM SERPL-MCNC: 8.7 MG/DL — SIGNIFICANT CHANGE UP (ref 8.4–10.5)
CHLORIDE BLDV-SCNC: 101 MMOL/L — SIGNIFICANT CHANGE UP (ref 96–108)
CHLORIDE SERPL-SCNC: 101 MMOL/L — SIGNIFICANT CHANGE UP (ref 96–108)
CO2 BLDV-SCNC: 31 MMOL/L — HIGH (ref 22–26)
CO2 SERPL-SCNC: 25 MMOL/L — SIGNIFICANT CHANGE UP (ref 22–31)
CREAT SERPL-MCNC: 0.81 MG/DL — SIGNIFICANT CHANGE UP (ref 0.5–1.3)
EOSINOPHIL # BLD AUTO: 0.33 K/UL — SIGNIFICANT CHANGE UP (ref 0–0.5)
EOSINOPHIL NFR BLD AUTO: 1.8 % — SIGNIFICANT CHANGE UP (ref 0–6)
GAS PNL BLDV: 135 MMOL/L — LOW (ref 136–145)
GAS PNL BLDV: SIGNIFICANT CHANGE UP
GAS PNL BLDV: SIGNIFICANT CHANGE UP
GLUCOSE BLDV-MCNC: 111 MG/DL — HIGH (ref 70–99)
GLUCOSE SERPL-MCNC: 109 MG/DL — HIGH (ref 70–99)
HCO3 BLDV-SCNC: 30 MMOL/L — HIGH (ref 22–29)
HCT VFR BLD CALC: 41.8 % — SIGNIFICANT CHANGE UP (ref 39–50)
HCT VFR BLDA CALC: 41 % — SIGNIFICANT CHANGE UP (ref 39–51)
HGB BLD CALC-MCNC: 13.7 G/DL — SIGNIFICANT CHANGE UP (ref 12.6–17.4)
HGB BLD-MCNC: 13.2 G/DL — SIGNIFICANT CHANGE UP (ref 13–17)
INR BLD: 1.14 RATIO — SIGNIFICANT CHANGE UP (ref 0.88–1.16)
LACTATE BLDV-MCNC: 1.5 MMOL/L — SIGNIFICANT CHANGE UP (ref 0.7–2)
LYMPHOCYTES # BLD AUTO: 31 % — SIGNIFICANT CHANGE UP (ref 13–44)
LYMPHOCYTES # BLD AUTO: 5.68 K/UL — HIGH (ref 1–3.3)
MAGNESIUM SERPL-MCNC: 2 MG/DL — SIGNIFICANT CHANGE UP (ref 1.6–2.6)
MANUAL SMEAR VERIFICATION: SIGNIFICANT CHANGE UP
MCHC RBC-ENTMCNC: 27.6 PG — SIGNIFICANT CHANGE UP (ref 27–34)
MCHC RBC-ENTMCNC: 31.6 GM/DL — LOW (ref 32–36)
MCV RBC AUTO: 87.4 FL — SIGNIFICANT CHANGE UP (ref 80–100)
MONOCYTES # BLD AUTO: 0.97 K/UL — HIGH (ref 0–0.9)
MONOCYTES NFR BLD AUTO: 5.3 % — SIGNIFICANT CHANGE UP (ref 2–14)
NEUTROPHILS # BLD AUTO: 11.34 K/UL — HIGH (ref 1.8–7.4)
NEUTROPHILS NFR BLD AUTO: 59.3 % — SIGNIFICANT CHANGE UP (ref 43–77)
NEUTS BAND # BLD: 2.6 % — SIGNIFICANT CHANGE UP (ref 0–8)
PCO2 BLDV: 48 MMHG — SIGNIFICANT CHANGE UP (ref 42–55)
PH BLDV: 7.4 — SIGNIFICANT CHANGE UP (ref 7.32–7.43)
PHOSPHATE SERPL-MCNC: 3.1 MG/DL — SIGNIFICANT CHANGE UP (ref 2.5–4.5)
PLAT MORPH BLD: NORMAL — SIGNIFICANT CHANGE UP
PLATELET # BLD AUTO: 353 K/UL — SIGNIFICANT CHANGE UP (ref 150–400)
PLATELET COUNT - ESTIMATE: NORMAL — SIGNIFICANT CHANGE UP
PO2 BLDV: 24 MMHG — LOW (ref 25–45)
POTASSIUM BLDV-SCNC: 3.6 MMOL/L — SIGNIFICANT CHANGE UP (ref 3.5–5.1)
POTASSIUM SERPL-MCNC: 3.4 MMOL/L — LOW (ref 3.5–5.3)
POTASSIUM SERPL-SCNC: 3.4 MMOL/L — LOW (ref 3.5–5.3)
PROT SERPL-MCNC: 8.2 G/DL — SIGNIFICANT CHANGE UP (ref 6–8.3)
PROTHROM AB SERPL-ACNC: 13.6 SEC — SIGNIFICANT CHANGE UP (ref 10.6–13.6)
RBC # BLD: 4.78 M/UL — SIGNIFICANT CHANGE UP (ref 4.2–5.8)
RBC # FLD: 15 % — HIGH (ref 10.3–14.5)
RBC BLD AUTO: SIGNIFICANT CHANGE UP
SAO2 % BLDV: 39.1 % — LOW (ref 67–88)
SODIUM SERPL-SCNC: 138 MMOL/L — SIGNIFICANT CHANGE UP (ref 135–145)
WBC # BLD: 18.32 K/UL — HIGH (ref 3.8–10.5)
WBC # FLD AUTO: 18.32 K/UL — HIGH (ref 3.8–10.5)

## 2021-11-23 PROCEDURE — 99285 EMERGENCY DEPT VISIT HI MDM: CPT | Mod: 25

## 2021-11-23 PROCEDURE — 93010 ELECTROCARDIOGRAM REPORT: CPT | Mod: NC

## 2021-11-23 RX ORDER — SODIUM CHLORIDE 9 MG/ML
1000 INJECTION INTRAMUSCULAR; INTRAVENOUS; SUBCUTANEOUS ONCE
Refills: 0 | Status: COMPLETED | OUTPATIENT
Start: 2021-11-23 | End: 2021-11-23

## 2021-11-23 NOTE — ED PROVIDER NOTE - PATIENT PORTAL LINK FT
You can access the FollowMyHealth Patient Portal offered by [facility] by registering at the following website: [email or web address]. By joining [employer]’s FollowMyHealth portal, you will also be able to view your health information using other applications (apps) compatible with our system.

## 2021-11-23 NOTE — ED ADULT NURSE NOTE - OBJECTIVE STATEMENT
36 year old male with swelling right lower anterior/medial leg x 1 day; no traumas to area; pt has hx of DVT, first in his 20's then again earlier this year after he had COVID in January; states he was on Xarelto for that but is no longer taking it; also has hx of ulcerative colitis and is taking Entyvio; pt is tachycardic to 130's; states his baseline is 110's; EKG was done in triage; placed on telemetry upon arrival to [facility]; pt is fully alert and oriented in no acute distress.

## 2021-11-23 NOTE — ED PROVIDER NOTE - NSFOLLOWUPINSTRUCTIONS_ED_ALL_ED_FT
--take tylenol or motrin as needed for pain. Take tylenol 650 mg every 6 hours alternating with motrin 600 mg every 6 hours (take tylenol or motrin then three hours later take the other then three hours later take the first).  --today, the lab tests we did include basic labs, the imaging tests we did include ultrasound lower extremity. results significant for no dvt; we are still pending some results (thyroid tests). we have included these test results in your paperwork. please take them to your follow-up appointment.   --given that you were in the ED today, we recommend a followup visit with your general doctor (primary care doctor) within 7 days AND cardiology within 7 days.   --your diagnosis is: right leg pain, tachycardia  --return to the ED if your current symptoms worsen, if your pain doesn't resolve with tylenol/motrin, if chest pain, if fevers.

## 2021-11-23 NOTE — ED ADULT NURSE NOTE - ED STAT RN HANDOFF TIME
-- continue metformin  -- start glimiperide each morning with breakfast  -- check sugars fasting and 2 hours after any meal (alternate meals)  -- followup in 2 weeks for recheck of sugars    Limit carbs, sweets and juice in diet    -- start otc generic c
01:42

## 2021-11-23 NOTE — ED PROVIDER NOTE - OBJECTIVE STATEMENT
37 y/o male PMHx Ulcerative Colitis, possible cardiolipin syndrome, DVTs in the past (last 2/2021 after COVID) completed xarelto 6/2021 now presenting to the ED with RLE swelling x2 days. Patient reported the swelling started in the right anterior knee extending to the right medial tib fib area with associated pain while ambulating. Patient reported his heart rate is usually elevated. Patient denied CP, SOB, abdominal pain, N/V/D, fever chills, cough, erythema to RLE, back pain, smoking, prolonged sedentary period. Pt works as a pharmacist

## 2021-11-23 NOTE — ED PROVIDER NOTE - ATTENDING CONTRIBUTION TO CARE
Patient with history of prior DVTs not on AC presenting with pain in front of R knee with swelling of RLE below knee.  No noted trauma.  On exam patient well appearing, tachycardic (reporting normally is tachycardic when in Emergency Department, feels nervous), lungs clear, satting well on room air, minor edema of RLE, small effusion of R knee without erythema/warmth.  Will obtain labs, duplex to evaluate for DVT.  Discussed possible CT scan of chest with patient given tachycardia, he is requesting to avoid it if DVT US negative.

## 2021-11-23 NOTE — ED ADULT NURSE NOTE - NSIMPLEMENTINTERV_GEN_ALL_ED
Implemented All Universal Safety Interventions:  [place] to call system. Call bell, personal items and telephone within reach. Instruct patient to call for assistance. Room bathroom lighting operational. Non-slip footwear when patient is off stretcher. Physically safe environment: no spills, clutter or unnecessary equipment. Stretcher in lowest position, wheels locked, appropriate side rails in place.

## 2021-11-23 NOTE — ED PROVIDER NOTE - NSFOLLOWUPCLINICS_GEN_ALL_ED_FT
[facility]  Cardiology  [address] [place] [address]  Phone: [phone]  Fax:   Follow Up Time: 7-10 Days

## 2021-11-23 NOTE — ED PROVIDER NOTE - PROGRESS NOTE DETAILS
Attending Yary:  received s/o, pt having pos leg swelling, fu us. US shows pop cyst which would explain his pain, however pt having persistent tachycardia in 130's, I treated pain, requested rectal temp which pt refused, wanted to give second iter as pt appears to have mild dehydration (he agrees) but does not want further iv fluids, I discussed cta for pe which pt does not want to get at this time, not on bb, pt does see cardiologist, assessed for other s/o infection as pt does have tachy and wbc, plus elevated wbc (pt is on prednisone daily for colitis is most likely), pt denies rash, sore throat, runy nose, urinary symptoms, I discussed I am very concerned with his persistent tachycardia added on mag/tsh. Since declining fluids/further workup I did recommend cardiology consult. Pt is also declining this. Did discuss ama as I would recommend these further therapies and workups however pt still wants to leave. The patient is clinically not intoxicated, free from distracting pain, appears to have intact insight, judgment and reason and in my medical opinion has the capacity to make decisions. The patient is also not under any duress to leave the hospital. In this scenario, it would be battery to subject a patient to treatment against his/her will.   I have voiced my concerns for the patient's health given that a full evaluation and treatment had not occurred. I have discussed the need for continued evaluation to determine if their symptoms are caused by a condition that present risk of death or morbidity. Risks including but not limited to death, permanent disability, prolonged hospitalization, prolonged illness, were discussed. I tried offering alternative options in hopes that the patient might be amenable to partial evaluation and treatment which would be medically beneficial to the patient, though the patient declined my options and insisted on leaving.   Because I have been unable to convince the patient to stay, I answered all of their questions about their condition and asked them to return to the ED as soon as possible to complete their evaluation, especially if their symptoms worsen or do not improve. I emphasized that leaving against medical advice does not preclude returning here for further evaluation. I asked the patient to return if they change their mind about further evaluation and treatment. I strongly encouraged the patient to return to this Emergency Department or any Emergency Department at any time, particularly with worsening symptoms. Megan Leahy MD, PGY-2: pt signed AMA paperwork, explained need to f/u with cardiology for tachycardia, explained ability to come back at any time to complete evaluation.

## 2021-11-24 VITALS
HEART RATE: 148 BPM | RESPIRATION RATE: 20 BRPM | TEMPERATURE: 98 F | DIASTOLIC BLOOD PRESSURE: 84 MMHG | OXYGEN SATURATION: 100 % | SYSTOLIC BLOOD PRESSURE: 119 MMHG

## 2021-11-24 LAB — SARS-COV-2 RNA SPEC QL NAA+PROBE: SIGNIFICANT CHANGE UP

## 2021-11-24 PROCEDURE — 82435 ASSAY OF BLOOD CHLORIDE: CPT

## 2021-11-24 PROCEDURE — 84132 ASSAY OF SERUM POTASSIUM: CPT

## 2021-11-24 PROCEDURE — 82330 ASSAY OF CALCIUM: CPT

## 2021-11-24 PROCEDURE — 84100 ASSAY OF PHOSPHORUS: CPT

## 2021-11-24 PROCEDURE — 85018 HEMOGLOBIN: CPT

## 2021-11-24 PROCEDURE — 93971 EXTREMITY STUDY: CPT

## 2021-11-24 PROCEDURE — 84295 ASSAY OF SERUM SODIUM: CPT

## 2021-11-24 PROCEDURE — 85014 HEMATOCRIT: CPT

## 2021-11-24 PROCEDURE — 36415 COLL VENOUS BLD VENIPUNCTURE: CPT

## 2021-11-24 PROCEDURE — 85025 COMPLETE CBC W/AUTO DIFF WBC: CPT

## 2021-11-24 PROCEDURE — 85730 THROMBOPLASTIN TIME PARTIAL: CPT

## 2021-11-24 PROCEDURE — 85610 PROTHROMBIN TIME: CPT

## 2021-11-24 PROCEDURE — U0005: CPT

## 2021-11-24 PROCEDURE — 82803 BLOOD GASES ANY COMBINATION: CPT

## 2021-11-24 PROCEDURE — 83735 ASSAY OF MAGNESIUM: CPT

## 2021-11-24 PROCEDURE — 99284 EMERGENCY DEPT VISIT MOD MDM: CPT | Mod: 25

## 2021-11-24 PROCEDURE — 80053 COMPREHEN METABOLIC PANEL: CPT

## 2021-11-24 PROCEDURE — 93971 EXTREMITY STUDY: CPT | Mod: 26,RT

## 2021-11-24 PROCEDURE — U0003: CPT

## 2021-11-24 PROCEDURE — 82947 ASSAY GLUCOSE BLOOD QUANT: CPT

## 2021-11-24 PROCEDURE — 83605 ASSAY OF LACTIC ACID: CPT

## 2021-11-24 PROCEDURE — 82565 ASSAY OF CREATININE: CPT

## 2021-11-24 RX ORDER — POTASSIUM CHLORIDE 20 MEQ
40 PACKET (EA) ORAL ONCE
Refills: 0 | Status: COMPLETED | OUTPATIENT
Start: 2021-11-24 | End: 2021-11-24

## 2021-11-24 RX ORDER — ACETAMINOPHEN 500 MG
975 TABLET ORAL ONCE
Refills: 0 | Status: COMPLETED | OUTPATIENT
Start: 2021-11-24 | End: 2021-11-24

## 2021-11-24 RX ORDER — SODIUM CHLORIDE 9 MG/ML
1000 INJECTION INTRAMUSCULAR; INTRAVENOUS; SUBCUTANEOUS ONCE
Refills: 0 | Status: COMPLETED | OUTPATIENT
Start: 2021-11-24 | End: 2021-11-24

## 2021-11-24 RX ORDER — IBUPROFEN 200 MG
600 TABLET ORAL ONCE
Refills: 0 | Status: COMPLETED | OUTPATIENT
Start: 2021-11-24 | End: 2021-11-24

## 2021-11-24 RX ADMIN — Medication 40 MILLIEQUIVALENT(S): at 00:19

## 2021-11-24 RX ADMIN — SODIUM CHLORIDE 1000 MILLILITER(S): 9 INJECTION INTRAMUSCULAR; INTRAVENOUS; SUBCUTANEOUS at 00:10

## 2021-11-24 NOTE — ED ADULT NURSE REASSESSMENT NOTE - NS ED NURSE REASSESS COMMENT FT1
Report received from Aure FUENTES RN. Pt resting in bed on portable tele box comfortably, awaiting USr. Safety maintained.

## 2021-11-24 NOTE — ED ADULT NURSE REASSESSMENT NOTE - NS ED NURSE REASSESS COMMENT FT1
Pt refusing fluids and repeat blood work at this time. Pt educated on importance of blood work/ receiving fluids, continuously refusing further interventions until USr return. MD aware.

## 2022-08-18 ENCOUNTER — NON-APPOINTMENT (OUTPATIENT)
Age: 37
End: 2022-08-18

## 2022-08-19 ENCOUNTER — APPOINTMENT (OUTPATIENT)
Dept: GASTROENTEROLOGY | Facility: CLINIC | Age: 37
End: 2022-08-19

## 2022-08-19 VITALS
SYSTOLIC BLOOD PRESSURE: 128 MMHG | HEIGHT: 68 IN | OXYGEN SATURATION: 98 % | DIASTOLIC BLOOD PRESSURE: 78 MMHG | HEART RATE: 70 BPM

## 2022-08-19 PROCEDURE — 99203 OFFICE O/P NEW LOW 30 MIN: CPT

## 2022-08-19 NOTE — HISTORY OF PRESENT ILLNESS
[de-identified] : Pt is a 36 y/o male, pt of Dr Lock and transitioning care due to insurance change, with UC pancolitis diagnosed 2013 initially treated with Lialda with bad reaction, then 6MP until 2021 with intermittent steroid use for flares characterized by abdominal pain, 3-4 loose BM daily, urgency, and nocturnal symptoms.. By 2021 he was on 6MP and dependant on steroids so decision was made to switch to entyvio+ steroids with plan to taper steroids after entyvio response. After some questionable response to entyvio, still unable to taper off steroids, and started on zeposia 6/2022. Since starting zeposia he has been feeling well with 1-2 formed BM daily with no abdominal pain or urgency and has been able to successfully taper prednisone down to 10mg which he is still on.  Of Note, he has Hx DVT+PE after thrombectomy in his early 20s, diagnosed as possible antiphospholipid/anti-cardiolipin ab and treated with coumadin for 2 years. Again had  DVT again after covid in 2021 and treated with eliquis for a few months. Currently not an any AC and hematologist suspects that his positive levels may have been correlated with disease activity as his levels were lower in periods of remission.\par \par EIM: denies skin changes, arthritis, or ocular symptoms. 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 Denies smoking, marijuana, or illicit drug use. Social alcohol use.

## 2022-08-19 NOTE — ASSESSMENT
[FreeTextEntry1] : Pt is a 36 y/o male, pt of Dr Lock and transitioning care due to insurance change, with UC pancolitis diagnosed 2013 initially treated with Lialda with bad reaction, failed 6MP and entyvio requiring steroids despite those meds, started zeposia in 6/2022 and able to taper down to 10mg prednisone which he is on now. + Hx DVT x2, questionable diagnosis of antiphospholipid ab.\par \par # UC pancolitis- now in clinical remission on zeposia and 10mg prednisone\par - last scope 5/2022 with Chowdhury 1 disease of entire colon. Terminal ileum appeared normal. Extensive pseudopolyps. Pathology with active colitis in almost all specimens\par - records from Dr Lock reviewed\par - c/w zeposia (new script sent) and 10mg prednisone until after wedding and will attempt a slow taper to 5mg for a few weeks before stopping completely\par - fecal calpro for baseline as he reports elevated levels in the past with flares\par \par \par \par f/u in 3 months

## 2022-08-19 NOTE — PHYSICAL EXAM
[General Appearance - Alert] : alert [General Appearance - In No Acute Distress] : in no acute distress [General Appearance - Well Nourished] : well nourished [Sclera] : the sclera and conjunctiva were normal [Outer Ear] : the ears and nose were normal in appearance [Neck Appearance] : the appearance of the neck was normal [Exaggerated Use Of Accessory Muscles For Inspiration] : no accessory muscle use [Auscultation Breath Sounds / Voice Sounds] : lungs were clear to auscultation bilaterally [Heart Rate And Rhythm] : heart rate was normal and rhythm regular [Heart Sounds] : normal S1 and S2 [Bowel Sounds] : normal bowel sounds [Abdomen Soft] : soft [Abdomen Tenderness] : non-tender [No CVA Tenderness] : no ~M costovertebral angle tenderness [No Spinal Tenderness] : no spinal tenderness [Abnormal Walk] : normal gait [] : no rash [No Focal Deficits] : no focal deficits [Oriented To Time, Place, And Person] : oriented to person, place, and time [Impaired Insight] : insight and judgment were intact [Affect] : the affect was normal

## 2022-09-07 ENCOUNTER — TRANSCRIPTION ENCOUNTER (OUTPATIENT)
Age: 37
End: 2022-09-07

## 2022-09-12 ENCOUNTER — TRANSCRIPTION ENCOUNTER (OUTPATIENT)
Age: 37
End: 2022-09-12

## 2022-09-15 NOTE — DISCHARGE NOTE NURSING/CASE MANAGEMENT/SOCIAL WORK - NSDCPEPT PROEDMA_GEN_ALL_CORE
Amlodipine 2.5 mg twice daily, or one tablet twice daily      Losartan 50 mg twice daily, or one tablet twice daily   
Yes

## 2022-10-06 ENCOUNTER — NON-APPOINTMENT (OUTPATIENT)
Age: 37
End: 2022-10-06

## 2022-10-06 LAB — CALPROTECTIN FECAL: 503 UG/G

## 2022-10-10 ENCOUNTER — RX RENEWAL (OUTPATIENT)
Age: 37
End: 2022-10-10

## 2022-10-26 ENCOUNTER — NON-APPOINTMENT (OUTPATIENT)
Age: 37
End: 2022-10-26

## 2022-10-27 ENCOUNTER — NON-APPOINTMENT (OUTPATIENT)
Age: 37
End: 2022-10-27

## 2022-11-17 ENCOUNTER — RX RENEWAL (OUTPATIENT)
Age: 37
End: 2022-11-17

## 2022-11-17 ENCOUNTER — APPOINTMENT (OUTPATIENT)
Dept: GASTROENTEROLOGY | Facility: CLINIC | Age: 37
End: 2022-11-17

## 2022-11-17 VITALS
DIASTOLIC BLOOD PRESSURE: 82 MMHG | WEIGHT: 146 LBS | OXYGEN SATURATION: 97 % | BODY MASS INDEX: 22.13 KG/M2 | SYSTOLIC BLOOD PRESSURE: 126 MMHG | HEART RATE: 76 BPM | HEIGHT: 68 IN | TEMPERATURE: 98.8 F

## 2022-11-17 PROCEDURE — 99214 OFFICE O/P EST MOD 30 MIN: CPT

## 2022-11-17 NOTE — HISTORY OF PRESENT ILLNESS
[FreeTextEntry1] : 37-year-old male\par Ulcerative colitis\par \par Currently on Zeposia for several months\par Failure to taper off prednisone, currently 20 mg daily\par At this time, no bleeding, regular bowel movement twice daily, controlled urgency\par Urgency is the main complaint that returns when he attempts to taper\par Generally no bleeding\par \par Prior failure of mercaptopurine, mesalamine's, Entyvio\par History of thrombosis\par \par Works for [employer], pharmacist corporate level

## 2022-11-17 NOTE — ASSESSMENT
[FreeTextEntry1] : Ulcerative colitis since 2013\par Multiple drug failures\par Somewhat better on Zeposia, but persistent failure to taper below prednisone 20 mg daily\par Patient acknowledges that some of his inability to taper may be due to anxiety about taper, however recent calprotectin over 500 suggest ongoing colitis activity\par \par Plan\par Repeat calprotectin\par Continue attempts at slow taper off of prednisone, 5 mg every other week\par Telephone follow-up as stool result becomes available\par Discussion regarding alternate therapy should he fail Zeposia, fail to taper off prednisone\par Options including TNF alpha inhibitors, Stelara\par Patient expresses good understanding and agreement

## 2022-12-04 ENCOUNTER — RX RENEWAL (OUTPATIENT)
Age: 37
End: 2022-12-04

## 2023-01-30 ENCOUNTER — RX RENEWAL (OUTPATIENT)
Age: 38
End: 2023-01-30

## 2023-02-06 ENCOUNTER — RX RENEWAL (OUTPATIENT)
Age: 38
End: 2023-02-06

## 2023-02-21 ENCOUNTER — TRANSCRIPTION ENCOUNTER (OUTPATIENT)
Age: 38
End: 2023-02-21

## 2023-02-22 ENCOUNTER — TRANSCRIPTION ENCOUNTER (OUTPATIENT)
Age: 38
End: 2023-02-22

## 2023-02-27 ENCOUNTER — NON-APPOINTMENT (OUTPATIENT)
Age: 38
End: 2023-02-27

## 2023-02-28 ENCOUNTER — TRANSCRIPTION ENCOUNTER (OUTPATIENT)
Age: 38
End: 2023-02-28

## 2023-03-02 ENCOUNTER — APPOINTMENT (OUTPATIENT)
Dept: GASTROENTEROLOGY | Facility: CLINIC | Age: 38
End: 2023-03-02
Payer: COMMERCIAL

## 2023-03-02 VITALS
OXYGEN SATURATION: 98 % | HEART RATE: 101 BPM | DIASTOLIC BLOOD PRESSURE: 70 MMHG | BODY MASS INDEX: 21.67 KG/M2 | SYSTOLIC BLOOD PRESSURE: 115 MMHG | HEIGHT: 68 IN | TEMPERATURE: 98.3 F | WEIGHT: 143 LBS

## 2023-03-02 LAB — GI PCR PANEL: NOT DETECTED

## 2023-03-02 PROCEDURE — 99214 OFFICE O/P EST MOD 30 MIN: CPT

## 2023-03-02 RX ORDER — ACETAMINOPHEN 325 MG/1
325 TABLET ORAL
Refills: 0 | Status: COMPLETED | OUTPATIENT
Start: 2023-03-02 | End: 1900-01-01

## 2023-03-02 RX ADMIN — ACETAMINOPHEN 0 MG: 325 TABLET ORAL at 00:00

## 2023-03-02 RX ADMIN — CETIRIZINE HYDROCHLORIDE 0 MG: 10 TABLET, FILM COATED ORAL at 00:00

## 2023-03-02 RX ADMIN — USTEKINUMAB MG/26ML: 130 SOLUTION INTRAVENOUS at 00:00

## 2023-03-02 NOTE — ASSESSMENT
[FreeTextEntry1] : Moderate to severely active ulcerative colitis\par Numerous drug failures as noted\par Corticosteroid dependence\par Zeposia clearly not working\par \par Indications for alternate therapy reviewed\par Given his history of blood clot, would naturally avoid Ranjeet inhibitor at this time\par Biologic options reviewed,\par Strong preference for Stelara\par Office follow-up for first injection 8 weeks following infusion\par Corticosteroid taper instructions provided\par Office visit sooner as needed\par \par \par Note: QuantiFERON gold - May 2022, results available in sunrise

## 2023-03-02 NOTE — HISTORY OF PRESENT ILLNESS
[FreeTextEntry1] : 37-year-old male\par Longstanding ulcerative colitis\par Prior failure of 6-mercaptopurine, used for years, never able to completely taper corticosteroids\par Prior failure of Entyvio, initial success, then loss of response, unsuccessful even at every 4-week therapy\par Failure of mesalamine\par Most recently, failure of Zeposia which he is currently using along with prednisone 50 mg daily which is just now starting to control his complaints\par Frequency, loose stools urgency....\par \par Initial stool testing negative for any pathogen\par Awaiting remaining stool tests\par Calprotectin as recently as September over 500\par \par 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

## 2023-03-03 ENCOUNTER — RX RENEWAL (OUTPATIENT)
Age: 38
End: 2023-03-03

## 2023-03-03 RX ORDER — USTEKINUMAB 130 MG/26ML
130 SOLUTION INTRAVENOUS
Qty: 0 | Refills: 0 | Status: COMPLETED | OUTPATIENT
Start: 2023-03-02 | End: 1900-01-01

## 2023-03-05 LAB
BACTERIA STL CULT: NORMAL
C DIFF TOXIN B QL PCR REFLEX: NORMAL
GDH ANTIGEN: NOT DETECTED
TOXIN A AND B: NOT DETECTED

## 2023-03-06 ENCOUNTER — NON-APPOINTMENT (OUTPATIENT)
Age: 38
End: 2023-03-06

## 2023-03-07 ENCOUNTER — NON-APPOINTMENT (OUTPATIENT)
Age: 38
End: 2023-03-07

## 2023-03-07 LAB — CALPROTECTIN FECAL: 841 UG/G

## 2023-03-08 ENCOUNTER — APPOINTMENT (OUTPATIENT)
Dept: HUMAN REPRODUCTION | Facility: CLINIC | Age: 38
End: 2023-03-08

## 2023-03-17 ENCOUNTER — TRANSCRIPTION ENCOUNTER (OUTPATIENT)
Age: 38
End: 2023-03-17

## 2023-04-04 ENCOUNTER — APPOINTMENT (OUTPATIENT)
Dept: RHEUMATOLOGY | Facility: CLINIC | Age: 38
End: 2023-04-04
Payer: COMMERCIAL

## 2023-04-04 VITALS
RESPIRATION RATE: 16 BRPM | HEART RATE: 91 BPM | SYSTOLIC BLOOD PRESSURE: 124 MMHG | DIASTOLIC BLOOD PRESSURE: 81 MMHG | OXYGEN SATURATION: 95 %

## 2023-04-04 VITALS
RESPIRATION RATE: 16 BRPM | DIASTOLIC BLOOD PRESSURE: 77 MMHG | OXYGEN SATURATION: 96 % | BODY MASS INDEX: 25.39 KG/M2 | SYSTOLIC BLOOD PRESSURE: 119 MMHG | HEART RATE: 114 BPM | WEIGHT: 167 LBS | TEMPERATURE: 98.1 F

## 2023-04-04 PROCEDURE — 96413 CHEMO IV INFUSION 1 HR: CPT

## 2023-04-04 RX ORDER — USTEKINUMAB 130 MG/26ML
130 SOLUTION INTRAVENOUS
Qty: 0 | Refills: 0 | Status: COMPLETED
Start: 2023-03-02

## 2023-04-04 RX ORDER — ACETAMINOPHEN 325 MG/1
325 TABLET ORAL
Qty: 0 | Refills: 0 | Status: COMPLETED
Start: 2023-03-02

## 2023-04-04 NOTE — HISTORY OF PRESENT ILLNESS
[Denies] : Denies [No] : No [N/A] : N/A [Declined] : Declined [Left upper extremity] : Left upper extremity [24g] : 24g [Start Time: ___] : Medication Start Time: [unfilled] [End Time: ___] : Medication End Time: [unfilled] [IV discontinued. Intact. No signs or symptoms of IV complications noted. Time: ___] : IV discontinued. Intact. No signs or symptoms of IV complications noted. Time: [unfilled] [Patient  instructed to seek medical attention with signs and symptoms of adverse effects] : Patient  instructed to seek medical attention with signs and symptoms of adverse effects [Patient left unit in no acute distress] : Patient left unit in no acute distress [Medications administered as ordered and tolerated well.] : Medications administered as ordered and tolerated well. [de-identified] : Median cubital vein [de-identified] : No labs drawn [de-identified] : Patient presents for Stelara infusion, doing well overall. Patient denies any recent infection or antibiotic use. Patient reports having 2 formed BM daily, without any blood or mucous. Patient denies any abdominal pain, cramping or bloating. Patient reports taking Prednisone 50MG daily and will be tapering down next week. Patient reports will f/u with Dr. Bishop 8 weeks post infusion for the first injection and will self inject going forward. Patient tolerated infusion well.

## 2023-04-05 ENCOUNTER — TRANSCRIPTION ENCOUNTER (OUTPATIENT)
Age: 38
End: 2023-04-05

## 2023-04-10 ENCOUNTER — RX RENEWAL (OUTPATIENT)
Age: 38
End: 2023-04-10

## 2023-04-19 ENCOUNTER — NON-APPOINTMENT (OUTPATIENT)
Age: 38
End: 2023-04-19

## 2023-04-21 ENCOUNTER — TRANSCRIPTION ENCOUNTER (OUTPATIENT)
Age: 38
End: 2023-04-21

## 2023-05-05 ENCOUNTER — TRANSCRIPTION ENCOUNTER (OUTPATIENT)
Age: 38
End: 2023-05-05

## 2023-05-19 ENCOUNTER — TRANSCRIPTION ENCOUNTER (OUTPATIENT)
Age: 38
End: 2023-05-19

## 2023-05-19 RX ORDER — PREDNISONE 5 MG/1
5 TABLET ORAL
Qty: 240 | Refills: 0 | Status: DISCONTINUED | COMMUNITY
Start: 2020-02-09 | End: 2023-05-19

## 2023-05-21 ENCOUNTER — NON-APPOINTMENT (OUTPATIENT)
Age: 38
End: 2023-05-21

## 2023-05-23 ENCOUNTER — NON-APPOINTMENT (OUTPATIENT)
Age: 38
End: 2023-05-23

## 2023-05-24 ENCOUNTER — APPOINTMENT (OUTPATIENT)
Dept: ORTHOPEDIC SURGERY | Facility: CLINIC | Age: 38
End: 2023-05-24
Payer: COMMERCIAL

## 2023-05-24 ENCOUNTER — NON-APPOINTMENT (OUTPATIENT)
Age: 38
End: 2023-05-24

## 2023-05-24 VITALS
OXYGEN SATURATION: 97 % | HEART RATE: 99 BPM | WEIGHT: 165 LBS | HEIGHT: 68 IN | SYSTOLIC BLOOD PRESSURE: 119 MMHG | DIASTOLIC BLOOD PRESSURE: 81 MMHG | BODY MASS INDEX: 25.01 KG/M2

## 2023-05-24 PROCEDURE — 99204 OFFICE O/P NEW MOD 45 MIN: CPT | Mod: 25

## 2023-05-24 PROCEDURE — 73140 X-RAY EXAM OF FINGER(S): CPT | Mod: F6

## 2023-05-24 PROCEDURE — 20550 NJX 1 TENDON SHEATH/LIGAMENT: CPT | Mod: F6

## 2023-05-30 ENCOUNTER — APPOINTMENT (OUTPATIENT)
Dept: GASTROENTEROLOGY | Facility: CLINIC | Age: 38
End: 2023-05-30
Payer: COMMERCIAL

## 2023-05-30 VITALS
WEIGHT: 165 LBS | TEMPERATURE: 97 F | HEART RATE: 98 BPM | OXYGEN SATURATION: 98 % | DIASTOLIC BLOOD PRESSURE: 80 MMHG | SYSTOLIC BLOOD PRESSURE: 120 MMHG | BODY MASS INDEX: 25.01 KG/M2 | HEIGHT: 68 IN

## 2023-05-30 PROCEDURE — 99212 OFFICE O/P EST SF 10 MIN: CPT

## 2023-05-30 NOTE — ASSESSMENT
[FreeTextEntry1] : This is a 38 year old male with an existing diagnosis of ulcerative colitis who presents today for his first maintenance Stelara injection. He states that he is doing well after receiving Stelara infusions. Patient requested that Stelara be injected into his arm and not his stomach. Stelara injected into the right upper arm. Patient tolerated the injection well. No immediate adverse effects noted. Next injection is due in 8 weeks. Patient advised to call the office if he has any questions or concerns.

## 2023-05-31 NOTE — HISTORY OF PRESENT ILLNESS
[de-identified] : DANIELA   is a 38 year right hand dominant M who presents with right index finger pain.  Pain is primarily located at the metacarpal phalangeal joint and pip joint. Main complaint is swelling, erythema and warmth of the finger.\par  It began approximately 1 month ago. States that he believed that he washed dishes, which irritated his cuticle. He observed swelling at the nail bed at that time.\par He was prescribed antibiotics and took the dose for 5 days.\par He states that the pain and swelling at the nail bed improved \par A few days lateral he observed swelling returned more proximal joint of the index finger and getting worse\par Patient was seen at [facility] where he had XR done at that time ad referred to this clinic\par He is concerned about the swelling and warmth\par No discharge \par No numbness/tingling\par  \par Denies bowel/bladder changes, fevers, chills, saddle anesthesia.  Denies numbness, tingling, weakness of the [lower/upper] extremities.\par

## 2023-06-02 ENCOUNTER — TRANSCRIPTION ENCOUNTER (OUTPATIENT)
Age: 38
End: 2023-06-02

## 2023-06-05 ENCOUNTER — APPOINTMENT (OUTPATIENT)
Dept: ORTHOPEDIC SURGERY | Facility: CLINIC | Age: 38
End: 2023-06-05
Payer: COMMERCIAL

## 2023-06-05 DIAGNOSIS — M65.321 TRIGGER FINGER, RIGHT INDEX FINGER: ICD-10-CM

## 2023-06-05 PROCEDURE — 20550 NJX 1 TENDON SHEATH/LIGAMENT: CPT | Mod: F6

## 2023-06-05 PROCEDURE — 99214 OFFICE O/P EST MOD 30 MIN: CPT | Mod: 25

## 2023-07-05 ENCOUNTER — APPOINTMENT (OUTPATIENT)
Dept: GASTROENTEROLOGY | Facility: CLINIC | Age: 38
End: 2023-07-05
Payer: COMMERCIAL

## 2023-07-05 ENCOUNTER — NON-APPOINTMENT (OUTPATIENT)
Age: 38
End: 2023-07-05

## 2023-07-05 VITALS
DIASTOLIC BLOOD PRESSURE: 80 MMHG | HEIGHT: 68 IN | BODY MASS INDEX: 24.71 KG/M2 | WEIGHT: 163 LBS | HEART RATE: 97 BPM | SYSTOLIC BLOOD PRESSURE: 120 MMHG | OXYGEN SATURATION: 98 %

## 2023-07-05 PROCEDURE — 99214 OFFICE O/P EST MOD 30 MIN: CPT

## 2023-08-18 NOTE — H&P ADULT - REASON FOR ADMISSION
Daily Note     Today's date: 2023  Patient name: Dayna Noriega  : 1980  MRN: [id number]  Referring provider: Jacques Lane*  Dx: No diagnosis found. Subjective: ***      Objective: See treatment diary below      Assessment: Tolerated treatment {Tolerated treatment :}. Patient {assessment:[id number]}      Plan: {PLAN:}     SOB

## 2023-08-20 ENCOUNTER — NON-APPOINTMENT (OUTPATIENT)
Age: 38
End: 2023-08-20

## 2023-08-21 ENCOUNTER — TRANSCRIPTION ENCOUNTER (OUTPATIENT)
Age: 38
End: 2023-08-21

## 2023-08-22 ENCOUNTER — TRANSCRIPTION ENCOUNTER (OUTPATIENT)
Age: 38
End: 2023-08-22

## 2023-08-23 ENCOUNTER — TRANSCRIPTION ENCOUNTER (OUTPATIENT)
Age: 38
End: 2023-08-23

## 2023-08-27 ENCOUNTER — NON-APPOINTMENT (OUTPATIENT)
Age: 38
End: 2023-08-27

## 2023-09-12 ENCOUNTER — TRANSCRIPTION ENCOUNTER (OUTPATIENT)
Age: 38
End: 2023-09-12

## 2023-09-14 ENCOUNTER — TRANSCRIPTION ENCOUNTER (OUTPATIENT)
Age: 38
End: 2023-09-14

## 2023-09-26 ENCOUNTER — TRANSCRIPTION ENCOUNTER (OUTPATIENT)
Age: 38
End: 2023-09-26

## 2023-11-15 ENCOUNTER — TRANSCRIPTION ENCOUNTER (OUTPATIENT)
Age: 38
End: 2023-11-15

## 2023-11-16 ENCOUNTER — TRANSCRIPTION ENCOUNTER (OUTPATIENT)
Age: 38
End: 2023-11-16

## 2023-11-27 ENCOUNTER — TRANSCRIPTION ENCOUNTER (OUTPATIENT)
Age: 38
End: 2023-11-27

## 2023-12-09 ENCOUNTER — APPOINTMENT (OUTPATIENT)
Dept: INTERNAL MEDICINE | Facility: CLINIC | Age: 38
End: 2023-12-09
Payer: COMMERCIAL

## 2023-12-09 ENCOUNTER — NON-APPOINTMENT (OUTPATIENT)
Age: 38
End: 2023-12-09

## 2023-12-09 VITALS
HEART RATE: 95 BPM | RESPIRATION RATE: 16 BRPM | DIASTOLIC BLOOD PRESSURE: 83 MMHG | OXYGEN SATURATION: 98 % | BODY MASS INDEX: 24.55 KG/M2 | TEMPERATURE: 98 F | HEIGHT: 68 IN | SYSTOLIC BLOOD PRESSURE: 115 MMHG | WEIGHT: 162 LBS

## 2023-12-09 DIAGNOSIS — Z00.00 ENCOUNTER FOR GENERAL ADULT MEDICAL EXAMINATION W/OUT ABNORMAL FINDINGS: ICD-10-CM

## 2023-12-09 DIAGNOSIS — Z23 ENCOUNTER FOR IMMUNIZATION: ICD-10-CM

## 2023-12-09 PROCEDURE — 99395 PREV VISIT EST AGE 18-39: CPT

## 2023-12-09 PROCEDURE — 90686 IIV4 VACC NO PRSV 0.5 ML IM: CPT

## 2023-12-09 PROCEDURE — 90471 IMMUNIZATION ADMIN: CPT

## 2023-12-13 PROBLEM — Z23 ENCOUNTER FOR IMMUNIZATION: Status: ACTIVE | Noted: 2023-12-13 | Resolved: 2023-12-27

## 2023-12-13 PROBLEM — Z23 ENCOUNTER FOR ADMINISTRATION OF COVID-19 VACCINE: Status: ACTIVE | Noted: 2023-12-13

## 2023-12-13 NOTE — HISTORY OF PRESENT ILLNESS
[FreeTextEntry1] : Annual Wellness Examination [de-identified] : 38 yrs old with hx DVT left leg 2006 extensive to femoral vein,  S/P thrombectomy.  As per pt, hypercoag workup neg. All testing neg for malignancy Cardiolipin AB high side of normal.  Also had PE- small at that time. Neg lupus  Took Coumadin for 2-3 yrs and then was D/C'd 2021 COVID + bilateral pneumonia treated with inpatient IV remdesivir    -Post hospitalization DVT RLE, S/P Xarelto 2 weeks, no PE. Hematology considered long term Xarelto but did not give. Ulcerative Colitis diagnosed 2013- pt did well on Mercaptopurine and prednisone, but there was concern  about cancer risk with long term use. Stelara is current medication wtih 40 mg prednisone daily- Dr Sultan FERRERA follows patient- considering Remicade. Goal is to decrease long term side effects from prednisone Most recently, right knee effusion -drained - this occurred after Stelara Past few months, pt has developed either swelling or effusion in joints after Stelara infusions

## 2023-12-13 NOTE — PLAN
[FreeTextEntry1] : 38-year-old male here for annual wellness exam. He has an extensive history of recurrent DVTs, pulmonary embolism, status post thrombectomy. Although cardiolipin antibody was on the high side of normal, patient was not placed on long-term anticoagulation. He also has a history of ulcerative colitis which despite several different treatments has not gone into remission.  He remains on fairly high doses of prednisone and is unable to wean down significantly.  Currently he is on Stelara which has not significantly helped his UC and for the last few months he seems to be developing reactions to it of joint swellings, redness and effusions. Ongoing, he sees multiple specialists including GI, vascular, hematology, and ophthalmology.  He has also developed prediabetes due to prednisone and is concerned about developing diabetes. I discussed with patient seeing a subspecialist for inflammatory bowel disease and gave him recommendations.  At the moment, this seems to be the greatest concern for the patient. Full lab work ordered including hemoglobin A1c, lipid, routine labs, urine screen, TFTs, LFTs. Patient is currently on a low-carb diet and eats healthy.  He also tries to exercise regularly. Medication and allergy  reconciliation done. All preventive screening discussed with patient. Flu vaccine  Return visit 6 months.  Patient understands instructions and agrees with plan.

## 2023-12-14 LAB
25(OH)D3 SERPL-MCNC: 12 NG/ML
ALBUMIN SERPL ELPH-MCNC: 4.3 G/DL
ALP BLD-CCNC: 49 U/L
ALT SERPL-CCNC: 22 U/L
ANION GAP SERPL CALC-SCNC: 13 MMOL/L
APPEARANCE: CLEAR
AST SERPL-CCNC: 18 U/L
BASOPHILS # BLD AUTO: 0.05 K/UL
BASOPHILS NFR BLD AUTO: 0.5 %
BILIRUB SERPL-MCNC: 0.6 MG/DL
BILIRUBIN URINE: NEGATIVE
BLOOD URINE: NEGATIVE
BUN SERPL-MCNC: 12 MG/DL
CALCIUM SERPL-MCNC: 8.9 MG/DL
CHLORIDE SERPL-SCNC: 104 MMOL/L
CHOLEST SERPL-MCNC: 232 MG/DL
CK SERPL-CCNC: 127 U/L
CO2 SERPL-SCNC: 25 MMOL/L
COLOR: YELLOW
CREAT SERPL-MCNC: 0.82 MG/DL
CREAT SPEC-SCNC: 159 MG/DL
CRP SERPL-MCNC: <3 MG/L
EGFR: 116 ML/MIN/1.73M2
EOSINOPHIL # BLD AUTO: 0.1 K/UL
EOSINOPHIL NFR BLD AUTO: 1.1 %
ERYTHROCYTE [SEDIMENTATION RATE] IN BLOOD BY WESTERGREN METHOD: 23 MM/HR
ESTIMATED AVERAGE GLUCOSE: 126 MG/DL
FERRITIN SERPL-MCNC: 114 NG/ML
FOLATE SERPL-MCNC: 12.2 NG/ML
GLUCOSE QUALITATIVE U: NEGATIVE MG/DL
GLUCOSE SERPL-MCNC: 91 MG/DL
HBA1C MFR BLD HPLC: 6 %
HCT VFR BLD CALC: 49.6 %
HDLC SERPL-MCNC: 80 MG/DL
HGB BLD-MCNC: 15.5 G/DL
IMM GRANULOCYTES NFR BLD AUTO: 1.2 %
IRON SATN MFR SERPL: 45 %
IRON SERPL-MCNC: 139 UG/DL
KETONES URINE: NEGATIVE MG/DL
LDLC SERPL CALC-MCNC: 130 MG/DL
LEUKOCYTE ESTERASE URINE: NEGATIVE
LYMPHOCYTES # BLD AUTO: 2.93 K/UL
LYMPHOCYTES NFR BLD AUTO: 31 %
MAGNESIUM SERPL-MCNC: 2.2 MG/DL
MAN DIFF?: NORMAL
MCHC RBC-ENTMCNC: 27.5 PG
MCHC RBC-ENTMCNC: 31.3 GM/DL
MCV RBC AUTO: 88.1 FL
MICROALBUMIN 24H UR DL<=1MG/L-MCNC: <1.2 MG/DL
MICROALBUMIN/CREAT 24H UR-RTO: NORMAL MG/G
MONOCYTES # BLD AUTO: 0.97 K/UL
MONOCYTES NFR BLD AUTO: 10.3 %
NEUTROPHILS # BLD AUTO: 5.3 K/UL
NEUTROPHILS NFR BLD AUTO: 55.9 %
NITRITE URINE: NEGATIVE
NONHDLC SERPL-MCNC: 152 MG/DL
PH URINE: 6.5
PLATELET # BLD AUTO: 304 K/UL
POTASSIUM SERPL-SCNC: 3.7 MMOL/L
PROT SERPL-MCNC: 6.7 G/DL
PROTEIN URINE: NEGATIVE MG/DL
RBC # BLD: 5.63 M/UL
RBC # FLD: 13.9 %
SODIUM SERPL-SCNC: 141 MMOL/L
SPECIFIC GRAVITY URINE: 1.02
T4 FREE SERPL-MCNC: 1.1 NG/DL
TIBC SERPL-MCNC: 310 UG/DL
TRIGL SERPL-MCNC: 129 MG/DL
TSH SERPL-ACNC: 2.56 UIU/ML
UIBC SERPL-MCNC: 171 UG/DL
URINE CYTOLOGY: NORMAL
UROBILINOGEN URINE: 1 MG/DL
VIT B12 SERPL-MCNC: 471 PG/ML
WBC # FLD AUTO: 9.46 K/UL

## 2023-12-21 ENCOUNTER — TRANSCRIPTION ENCOUNTER (OUTPATIENT)
Age: 38
End: 2023-12-21

## 2023-12-21 ENCOUNTER — NON-APPOINTMENT (OUTPATIENT)
Age: 38
End: 2023-12-21

## 2023-12-21 DIAGNOSIS — E55.9 VITAMIN D DEFICIENCY, UNSPECIFIED: ICD-10-CM

## 2024-01-17 ENCOUNTER — APPOINTMENT (OUTPATIENT)
Dept: GASTROENTEROLOGY | Facility: AMBULATORY MEDICAL SERVICES | Age: 39
End: 2024-01-17
Payer: COMMERCIAL

## 2024-01-17 PROCEDURE — 45385 COLONOSCOPY W/LESION REMOVAL: CPT

## 2024-01-17 PROCEDURE — 45380 COLONOSCOPY AND BIOPSY: CPT | Mod: 59

## 2024-01-22 ENCOUNTER — TRANSCRIPTION ENCOUNTER (OUTPATIENT)
Age: 39
End: 2024-01-22

## 2024-01-23 ENCOUNTER — NON-APPOINTMENT (OUTPATIENT)
Age: 39
End: 2024-01-23

## 2024-01-23 RX ORDER — CETIRIZINE HYDROCHLORIDE 10 MG/1
10 TABLET, FILM COATED ORAL
Refills: 0 | Status: DISCONTINUED | OUTPATIENT
Start: 2023-03-02 | End: 2024-01-23

## 2024-01-23 RX ORDER — SODIUM PICOSULFATE, MAGNESIUM OXIDE, AND ANHYDROUS CITRIC ACID 10; 3.5; 12 MG/160ML; G/160ML; G/160ML
10-3.5-12 MG-GM LIQUID ORAL
Qty: 1 | Refills: 0 | Status: DISCONTINUED | COMMUNITY
Start: 2023-11-17 | End: 2024-01-23

## 2024-01-23 RX ORDER — USTEKINUMAB 90 MG/ML
90 INJECTION, SOLUTION SUBCUTANEOUS
Qty: 1 | Refills: 5 | Status: DISCONTINUED | COMMUNITY
Start: 2023-03-02 | End: 2024-01-23

## 2024-01-23 RX ORDER — OZANIMOD HYDROCHLORIDE 0.92 MG/1
0.92 CAPSULE ORAL
Qty: 30 | Refills: 2 | Status: DISCONTINUED | COMMUNITY
Start: 2022-08-19 | End: 2024-01-23

## 2024-01-30 LAB
HBV CORE IGG+IGM SER QL: NONREACTIVE
HBV SURFACE AB SER QL: REACTIVE
HBV SURFACE AG SER QL: NONREACTIVE

## 2024-01-31 LAB
M TB IFN-G BLD-IMP: NEGATIVE
QUANTIFERON TB PLUS MITOGEN MINUS NIL: 2.63 IU/ML
QUANTIFERON TB PLUS NIL: 0.01 IU/ML
QUANTIFERON TB PLUS TB1 MINUS NIL: 0 IU/ML
QUANTIFERON TB PLUS TB2 MINUS NIL: 0 IU/ML

## 2024-02-08 NOTE — ED PROVIDER NOTE - NSFOLLOWUPINSTRUCTIONS_ED_ALL_ED_FT
[de-identified] : Acute cholecystitis that required percutaneous cholecystectomy 12/22/23 at home, no pain toleration diet drain in place drainage bile. no fevers no chills,  Deep Vein Thrombosis    A deep vein thrombosis (DVT) is a blood clot (thrombus) that usually occurs in a deep, larger vein of the lower leg or the pelvis, or in an upper extremity such as the arm. These are dangerous and can lead to serious and even life-threatening complications if the clot travels to the lungs. Symptoms include swelling of the arm or leg, warmth and redness of the arm or leg, and pain. Treatment may include taking a blood thinning medication; make sure to take anything prescribed as instructed.    SEEK IMMEDIATE MEDICAL CARE IF YOU HAVE ANY OF THE FOLLOWING SYMPTOMS: shortness of breath, chest pain, rapid or irregular heartbeat, lightheadedness/dizziness, coughing up blood, or any bleeding in your vomit, stool, or urine. These symptoms may represent a serious problem that is an emergency. Do not wait to see if the symptoms will go away. Call 911 and do not drive yourself to the hospital.     Call your hematologist to schedule follow up appointment for potential continued anticoagulation beyond one month. Call Dr. Leal to schedule a vascular appointment - we will attempt to expedite your appointment as well.

## 2024-02-12 ENCOUNTER — TRANSCRIPTION ENCOUNTER (OUTPATIENT)
Age: 39
End: 2024-02-12

## 2024-02-15 RX ORDER — ACETAMINOPHEN 325 MG/1
325 TABLET ORAL
Refills: 0 | Status: ACTIVE | OUTPATIENT
Start: 2024-01-31 | End: 1900-01-01

## 2024-02-15 RX ORDER — CETIRIZINE HYDROCHLORIDE 10 MG/1
10 TABLET, FILM COATED ORAL
Refills: 0 | Status: ACTIVE | OUTPATIENT
Start: 2024-01-31 | End: 1900-01-01

## 2024-02-24 NOTE — ED ADULT NURSE NOTE - CHIEF COMPLAINT
aerobic capacity/endurance/gait, locomotion, and balance/muscle strength/posture/ROM
The patient is a 35y Male complaining of

## 2024-02-26 ENCOUNTER — TRANSCRIPTION ENCOUNTER (OUTPATIENT)
Age: 39
End: 2024-02-26

## 2024-02-27 ENCOUNTER — TRANSCRIPTION ENCOUNTER (OUTPATIENT)
Age: 39
End: 2024-02-27

## 2024-02-27 RX ORDER — ERGOCALCIFEROL 1.25 MG/1
1.25 MG CAPSULE, LIQUID FILLED ORAL
Qty: 12 | Refills: 1 | Status: ACTIVE | COMMUNITY
Start: 2023-12-21 | End: 1900-01-01

## 2024-03-01 ENCOUNTER — APPOINTMENT (OUTPATIENT)
Dept: RHEUMATOLOGY | Facility: CLINIC | Age: 39
End: 2024-03-01
Payer: COMMERCIAL

## 2024-03-01 ENCOUNTER — MED ADMIN CHARGE (OUTPATIENT)
Age: 39
End: 2024-03-01

## 2024-03-01 VITALS
SYSTOLIC BLOOD PRESSURE: 121 MMHG | DIASTOLIC BLOOD PRESSURE: 78 MMHG | BODY MASS INDEX: 25.09 KG/M2 | HEART RATE: 111 BPM | RESPIRATION RATE: 16 BRPM | TEMPERATURE: 98 F | OXYGEN SATURATION: 95 % | WEIGHT: 165 LBS

## 2024-03-01 VITALS
HEART RATE: 82 BPM | DIASTOLIC BLOOD PRESSURE: 71 MMHG | OXYGEN SATURATION: 97 % | SYSTOLIC BLOOD PRESSURE: 108 MMHG | RESPIRATION RATE: 16 BRPM

## 2024-03-01 PROCEDURE — 96415 CHEMO IV INFUSION ADDL HR: CPT

## 2024-03-01 PROCEDURE — 96413 CHEMO IV INFUSION 1 HR: CPT

## 2024-03-01 RX ORDER — INFLIXIMAB 100 MG/10ML
100 INJECTION, POWDER, LYOPHILIZED, FOR SOLUTION INTRAVENOUS
Qty: 1 | Refills: 0 | Status: COMPLETED
Start: 2024-01-31

## 2024-03-01 RX ORDER — ACETAMINOPHEN 325 MG/1
325 TABLET ORAL
Qty: 0 | Refills: 0 | Status: COMPLETED
Start: 2024-01-31

## 2024-03-01 RX ORDER — CETIRIZINE HYDROCHLORIDE 10 MG/1
10 TABLET, FILM COATED ORAL
Qty: 0 | Refills: 0 | Status: COMPLETED
Start: 2024-01-31

## 2024-03-01 NOTE — HISTORY OF PRESENT ILLNESS
[Denies] : Denies [No] : No [N/A] : N/A [Declined] : Declined [Left upper extremity] : Left upper extremity [22g] : 22g [Start Time: ___] : Medication Start Time: [unfilled] [End Time: ___] : Medication End Time: [unfilled] [Medication Name: ___] : Medication Name: [unfilled] [Total Amount Administered: ___] : Total Amount Administered: [unfilled] [IV discontinued. Intact. No signs or symptoms of IV complications noted. Time: ___] : IV discontinued. Intact. No signs or symptoms of IV complications noted. Time: [unfilled] [Patient  instructed to seek medical attention with signs and symptoms of adverse effects] : Patient  instructed to seek medical attention with signs and symptoms of adverse effects [Patient left unit in no acute distress] : Patient left unit in no acute distress [Medications administered as ordered and tolerated well.] : Medications administered as ordered and tolerated well. [de-identified] : median cubital vein [de-identified] : Patient presents for week 0 of Remicade infusion, doing well overall. Patient oriented to facility, medication explained to patient and patient verbalized understanding. Patient denies any recent infection, antibiotic use or hospitalizations. Patient reports having 1 formed BM daily, without any blood or mucous. Patient denies any abdominal pain, cramping or bloating. Patient denies any urgency, nausea or vomiting. Patient reports being on Prednisone 70MG and is going up and down on the dose depending on symptoms. Patient previously on Stelara, last dose was at the ending on January. No other symptoms or concerns noted at this time. Patient pre-medicated and tolerated infusion well.

## 2024-03-04 ENCOUNTER — TRANSCRIPTION ENCOUNTER (OUTPATIENT)
Age: 39
End: 2024-03-04

## 2024-03-15 ENCOUNTER — MED ADMIN CHARGE (OUTPATIENT)
Age: 39
End: 2024-03-15

## 2024-03-15 ENCOUNTER — APPOINTMENT (OUTPATIENT)
Dept: RHEUMATOLOGY | Facility: CLINIC | Age: 39
End: 2024-03-15
Payer: COMMERCIAL

## 2024-03-15 VITALS — DIASTOLIC BLOOD PRESSURE: 72 MMHG | OXYGEN SATURATION: 95 % | SYSTOLIC BLOOD PRESSURE: 110 MMHG | HEART RATE: 105 BPM

## 2024-03-15 VITALS
OXYGEN SATURATION: 95 % | TEMPERATURE: 97.3 F | HEART RATE: 117 BPM | SYSTOLIC BLOOD PRESSURE: 128 MMHG | RESPIRATION RATE: 16 BRPM | DIASTOLIC BLOOD PRESSURE: 86 MMHG

## 2024-03-15 PROCEDURE — 96413 CHEMO IV INFUSION 1 HR: CPT

## 2024-03-15 PROCEDURE — 96415 CHEMO IV INFUSION ADDL HR: CPT

## 2024-03-15 RX ORDER — INFLIXIMAB 100 MG/10ML
100 INJECTION, POWDER, LYOPHILIZED, FOR SOLUTION INTRAVENOUS
Qty: 1 | Refills: 0 | Status: COMPLETED
Start: 2024-01-31

## 2024-03-15 RX ORDER — ACETAMINOPHEN 325 MG/1
325 TABLET ORAL
Qty: 0 | Refills: 0 | Status: COMPLETED
Start: 2024-01-31

## 2024-03-15 NOTE — HISTORY OF PRESENT ILLNESS
[N/A] : N/A [Denies] : Denies [No] : No [Yes] : Yes [Declined] : Declined [Left upper extremity] : Left upper extremity [22g] : 22g [Start Time: ___] : Medication Start Time: [unfilled] [End Time: ___] : Medication End Time: [unfilled] [Medication Name: ___] : Medication Name: [unfilled] [Total Amount Administered: ___] : Total Amount Administered: [unfilled] [IV discontinued. Intact. No signs or symptoms of IV complications noted. Time: ___] : IV discontinued. Intact. No signs or symptoms of IV complications noted. Time: [unfilled] [Patient  instructed to seek medical attention with signs and symptoms of adverse effects] : Patient  instructed to seek medical attention with signs and symptoms of adverse effects [Patient left unit in no acute distress] : Patient left unit in no acute distress [de-identified] : median cubital vein [Medications administered as ordered and tolerated well.] : Medications administered as ordered and tolerated well. [de-identified] : Patient presents for week 2 Remicade infusion, doing well overall. Patient reports having last infusion tolerated well and denies any AEs. Patient denies any recent infection, antibiotic use or hospitalizations. Patient reports having 1 formed BM daily, without any blood or mucous. Patient denies any abdominal pain, cramping or bloating. Patient denies any urgency, nausea or vomiting. Patient reports taking Prednisone 60MG, tapering down. No other symptoms or concerns noted at this time. Patient declined Zyrtec and pre-medicated with Tylenol only. Tolerated infusion well.

## 2024-04-12 ENCOUNTER — APPOINTMENT (OUTPATIENT)
Dept: RHEUMATOLOGY | Facility: CLINIC | Age: 39
End: 2024-04-12
Payer: COMMERCIAL

## 2024-04-12 ENCOUNTER — MED ADMIN CHARGE (OUTPATIENT)
Age: 39
End: 2024-04-12

## 2024-04-12 VITALS
DIASTOLIC BLOOD PRESSURE: 82 MMHG | RESPIRATION RATE: 16 BRPM | HEART RATE: 113 BPM | SYSTOLIC BLOOD PRESSURE: 135 MMHG | TEMPERATURE: 97.1 F | OXYGEN SATURATION: 96 %

## 2024-04-12 VITALS — OXYGEN SATURATION: 95 % | DIASTOLIC BLOOD PRESSURE: 74 MMHG | SYSTOLIC BLOOD PRESSURE: 121 MMHG | HEART RATE: 80 BPM

## 2024-04-12 PROCEDURE — 96413 CHEMO IV INFUSION 1 HR: CPT

## 2024-04-12 PROCEDURE — 96415 CHEMO IV INFUSION ADDL HR: CPT

## 2024-04-12 RX ORDER — INFLIXIMAB 100 MG/10ML
100 INJECTION, POWDER, LYOPHILIZED, FOR SOLUTION INTRAVENOUS
Qty: 1 | Refills: 0 | Status: COMPLETED
Start: 2024-01-31

## 2024-04-12 RX ORDER — ACETAMINOPHEN 325 MG/1
325 TABLET ORAL
Qty: 0 | Refills: 0 | Status: COMPLETED
Start: 2024-01-31

## 2024-04-12 NOTE — HISTORY OF PRESENT ILLNESS
[N/A] : N/A [Denies] : Denies [No] : No [Yes] : Yes [Declined] : Declined [Right upper extremity] : Right upper extremity [22g] : 22g [Start Time: ___] : Medication Start Time: [unfilled] [End Time: ___] : Medication End Time: [unfilled] [Medication Name: ___] : Medication Name: [unfilled] [Total Amount Administered: ___] : Total Amount Administered: [unfilled] [IV discontinued. Intact. No signs or symptoms of IV complications noted. Time: ___] : IV discontinued. Intact. No signs or symptoms of IV complications noted. Time: [unfilled] [Patient  instructed to seek medical attention with signs and symptoms of adverse effects] : Patient  instructed to seek medical attention with signs and symptoms of adverse effects [Patient left unit in no acute distress] : Patient left unit in no acute distress [Medications administered as ordered and tolerated well.] : Medications administered as ordered and tolerated well. [de-identified] : right arm cephalic vein  [de-identified] : Patient presents for week 6 Remicade infusion, doing well overall. Patient denies any recent infection, antibiotic use or hospitalizations. Patient reports having 1 formed BM daily, without any blood or mucous. Patient denies any abdominal pain, cramping or bloating. Patient denies any urgency, nausea or vomiting. Patient reports taking Prednisone 30mg, tapering down. No other symptoms or concerns noted at this time. Patient declined Zyrtec and pre-medicated with Tylenol only. Tolerated infusion well.

## 2024-04-22 ENCOUNTER — TRANSCRIPTION ENCOUNTER (OUTPATIENT)
Age: 39
End: 2024-04-22

## 2024-05-30 ENCOUNTER — RX RENEWAL (OUTPATIENT)
Age: 39
End: 2024-05-30

## 2024-05-30 RX ORDER — PREDNISONE 10 MG/1
10 TABLET ORAL
Qty: 240 | Refills: 0 | Status: ACTIVE | COMMUNITY
Start: 2022-10-27 | End: 1900-01-01

## 2024-06-03 ENCOUNTER — TRANSCRIPTION ENCOUNTER (OUTPATIENT)
Age: 39
End: 2024-06-03

## 2024-06-04 ENCOUNTER — TRANSCRIPTION ENCOUNTER (OUTPATIENT)
Age: 39
End: 2024-06-04

## 2024-06-04 DIAGNOSIS — K51.90 ULCERATIVE COLITIS, UNSPECIFIED, W/OUT COMPLICATIONS: ICD-10-CM

## 2024-06-06 ENCOUNTER — NON-APPOINTMENT (OUTPATIENT)
Age: 39
End: 2024-06-06

## 2024-06-07 ENCOUNTER — MED ADMIN CHARGE (OUTPATIENT)
Age: 39
End: 2024-06-07

## 2024-06-07 ENCOUNTER — NON-APPOINTMENT (OUTPATIENT)
Age: 39
End: 2024-06-07

## 2024-06-07 ENCOUNTER — APPOINTMENT (OUTPATIENT)
Dept: RHEUMATOLOGY | Facility: CLINIC | Age: 39
End: 2024-06-07
Payer: COMMERCIAL

## 2024-06-07 VITALS
OXYGEN SATURATION: 96 % | RESPIRATION RATE: 16 BRPM | DIASTOLIC BLOOD PRESSURE: 77 MMHG | HEART RATE: 90 BPM | SYSTOLIC BLOOD PRESSURE: 121 MMHG

## 2024-06-07 VITALS
OXYGEN SATURATION: 99 % | DIASTOLIC BLOOD PRESSURE: 77 MMHG | SYSTOLIC BLOOD PRESSURE: 115 MMHG | HEART RATE: 98 BPM | RESPIRATION RATE: 16 BRPM | TEMPERATURE: 98 F

## 2024-06-07 PROCEDURE — 36415 COLL VENOUS BLD VENIPUNCTURE: CPT

## 2024-06-07 PROCEDURE — 96415 CHEMO IV INFUSION ADDL HR: CPT

## 2024-06-07 PROCEDURE — 96413 CHEMO IV INFUSION 1 HR: CPT

## 2024-06-07 RX ORDER — INFLIXIMAB 100 MG/10ML
100 INJECTION, POWDER, LYOPHILIZED, FOR SOLUTION INTRAVENOUS
Qty: 1 | Refills: 0 | Status: COMPLETED
Start: 2024-01-31

## 2024-06-07 NOTE — HISTORY OF PRESENT ILLNESS
[5] : 5 [N/A] : N/A [Denies] : Denies [No] : No [Yes] : Yes [Informed consent documented in EHR.] : Informed consent documented in EHR. [de-identified] : abdominal pain [22g] : 22g [Start Time: ___] : Medication Start Time: [unfilled] [End Time: ___] : Medication End Time: [unfilled] [Medication Name: ___] : Medication Name: [unfilled] [Total Amount Administered: ___] : Total Amount Administered: [unfilled] [IV discontinued. Intact. No signs or symptoms of IV complications noted. Time: ___] : IV discontinued. Intact. No signs or symptoms of IV complications noted. Time: [unfilled] [Patient  instructed to seek medical attention with signs and symptoms of adverse effects] : Patient  instructed to seek medical attention with signs and symptoms of adverse effects [Patient left unit in no acute distress] : Patient left unit in no acute distress [Medications administered as ordered and tolerated well.] : Medications administered as ordered and tolerated well. [Blood drawn at time of visit] : Blood drawn at time of visit [de-identified] : right arm median cubital vein [de-identified] : labs drawn as prescribed by Dr. Bishop [de-identified] : Patient presents for scheduled  Remicade infusion, ambulatory in [facility]. Patient reports having flareup about 2 weeks ago, currently on Prednisone 70 mg daily. Today, patient reports having abdominal pain as rated above, reports 3 to 4 BM's daily, formed stool without any blood or mucous present. Reports urgency.  Patient denies any nausea or vomiting.  No other symptoms or concerns verbalized at this time. Premeds declined. Medication administered as prescribed, and infusion tolerated well.

## 2024-06-08 LAB
ALBUMIN SERPL ELPH-MCNC: 4.2 G/DL
ALP BLD-CCNC: 56 U/L
ALT SERPL-CCNC: 21 U/L
ANION GAP SERPL CALC-SCNC: 26 MMOL/L
AST SERPL-CCNC: 16 U/L
BILIRUB SERPL-MCNC: 0.3 MG/DL
BUN SERPL-MCNC: 13 MG/DL
CALCIUM SERPL-MCNC: 9.2 MG/DL
CHLORIDE SERPL-SCNC: 97 MMOL/L
CO2 SERPL-SCNC: 18 MMOL/L
CREAT SERPL-MCNC: 0.95 MG/DL
EGFR: 105 ML/MIN/1.73M2
HCT VFR BLD CALC: 49.4 %
HGB BLD-MCNC: 15.4 G/DL
MCHC RBC-ENTMCNC: 27.7 PG
MCHC RBC-ENTMCNC: 31.2 GM/DL
MCV RBC AUTO: 89 FL
PLATELET # BLD AUTO: 291 K/UL
POTASSIUM SERPL-SCNC: 4.1 MMOL/L
PROT SERPL-MCNC: 6.8 G/DL
RBC # BLD: 5.55 M/UL
RBC # FLD: 14.6 %
SODIUM SERPL-SCNC: 142 MMOL/L
WBC # FLD AUTO: 15.01 K/UL

## 2024-06-11 ENCOUNTER — NON-APPOINTMENT (OUTPATIENT)
Age: 39
End: 2024-06-11

## 2024-06-12 ENCOUNTER — APPOINTMENT (OUTPATIENT)
Dept: OPHTHALMOLOGY | Facility: CLINIC | Age: 39
End: 2024-06-12
Payer: COMMERCIAL

## 2024-06-12 PROCEDURE — 92133 CPTRZD OPH DX IMG PST SGM ON: CPT

## 2024-06-12 PROCEDURE — 92004 COMPRE OPH EXAM NEW PT 1/>: CPT

## 2024-06-17 ENCOUNTER — TRANSCRIPTION ENCOUNTER (OUTPATIENT)
Age: 39
End: 2024-06-17

## 2024-06-17 LAB
INFLIXIMAB AB SERPL-MCNC: <22 NG/ML
INFLIXIMAB SERPL-MCNC: 3.8 UG/ML

## 2024-06-18 ENCOUNTER — TRANSCRIPTION ENCOUNTER (OUTPATIENT)
Age: 39
End: 2024-06-18

## 2024-06-18 RX ORDER — INFLIXIMAB 100 MG/10ML
100 INJECTION, POWDER, LYOPHILIZED, FOR SOLUTION INTRAVENOUS
Refills: 0 | Status: ACTIVE | OUTPATIENT
Start: 2024-01-31

## 2024-06-25 ENCOUNTER — APPOINTMENT (OUTPATIENT)
Dept: OPHTHALMOLOGY | Facility: CLINIC | Age: 39
End: 2024-06-25

## 2024-06-25 ENCOUNTER — NON-APPOINTMENT (OUTPATIENT)
Age: 39
End: 2024-06-25

## 2024-06-25 PROCEDURE — 92310J: CUSTOM

## 2024-06-25 PROCEDURE — 92310D: CUSTOM | Mod: 76

## 2024-06-25 PROCEDURE — 92310 CONTACT LENS FITTING OU: CPT

## 2024-07-05 ENCOUNTER — APPOINTMENT (OUTPATIENT)
Dept: RHEUMATOLOGY | Facility: CLINIC | Age: 39
End: 2024-07-05
Payer: COMMERCIAL

## 2024-07-05 ENCOUNTER — MED ADMIN CHARGE (OUTPATIENT)
Age: 39
End: 2024-07-05

## 2024-07-05 VITALS
RESPIRATION RATE: 16 BRPM | TEMPERATURE: 98 F | DIASTOLIC BLOOD PRESSURE: 81 MMHG | HEART RATE: 104 BPM | SYSTOLIC BLOOD PRESSURE: 119 MMHG | OXYGEN SATURATION: 95 %

## 2024-07-05 VITALS — HEART RATE: 95 BPM | OXYGEN SATURATION: 95 % | DIASTOLIC BLOOD PRESSURE: 82 MMHG | SYSTOLIC BLOOD PRESSURE: 116 MMHG

## 2024-07-05 PROCEDURE — 96413 CHEMO IV INFUSION 1 HR: CPT

## 2024-07-05 PROCEDURE — 96415 CHEMO IV INFUSION ADDL HR: CPT

## 2024-07-05 RX ORDER — INFLIXIMAB 100 MG/10ML
100 INJECTION, POWDER, LYOPHILIZED, FOR SOLUTION INTRAVENOUS
Qty: 1 | Refills: 0 | Status: COMPLETED
Start: 2024-01-31

## 2024-07-09 ENCOUNTER — APPOINTMENT (OUTPATIENT)
Dept: OPHTHALMOLOGY | Facility: CLINIC | Age: 39
End: 2024-07-09
Payer: COMMERCIAL

## 2024-07-09 ENCOUNTER — NON-APPOINTMENT (OUTPATIENT)
Age: 39
End: 2024-07-09

## 2024-07-09 ENCOUNTER — APPOINTMENT (OUTPATIENT)
Dept: OPHTHALMOLOGY | Facility: CLINIC | Age: 39
End: 2024-07-09

## 2024-07-09 PROCEDURE — 99199 UNLISTED SPECIAL SVC PX/RPRT: CPT | Mod: NC

## 2024-07-16 ENCOUNTER — TRANSCRIPTION ENCOUNTER (OUTPATIENT)
Age: 39
End: 2024-07-16

## 2024-07-24 RX ORDER — INFLIXIMAB 100 MG/10ML
100 INJECTION, POWDER, LYOPHILIZED, FOR SOLUTION INTRAVENOUS
Refills: 0 | Status: ACTIVE | OUTPATIENT
Start: 2024-07-24 | End: 1900-01-01

## 2024-08-02 ENCOUNTER — APPOINTMENT (OUTPATIENT)
Dept: RHEUMATOLOGY | Facility: CLINIC | Age: 39
End: 2024-08-02
Payer: COMMERCIAL

## 2024-08-02 ENCOUNTER — MED ADMIN CHARGE (OUTPATIENT)
Age: 39
End: 2024-08-02

## 2024-08-02 VITALS
OXYGEN SATURATION: 100 % | RESPIRATION RATE: 16 BRPM | DIASTOLIC BLOOD PRESSURE: 75 MMHG | HEART RATE: 73 BPM | SYSTOLIC BLOOD PRESSURE: 112 MMHG

## 2024-08-02 VITALS
OXYGEN SATURATION: 99 % | SYSTOLIC BLOOD PRESSURE: 106 MMHG | RESPIRATION RATE: 16 BRPM | HEART RATE: 99 BPM | TEMPERATURE: 98.2 F | DIASTOLIC BLOOD PRESSURE: 73 MMHG

## 2024-08-02 PROCEDURE — 96413 CHEMO IV INFUSION 1 HR: CPT

## 2024-08-02 PROCEDURE — 96415 CHEMO IV INFUSION ADDL HR: CPT

## 2024-08-02 RX ORDER — INFLIXIMAB 100 MG/10ML
100 INJECTION, POWDER, LYOPHILIZED, FOR SOLUTION INTRAVENOUS
Qty: 1 | Refills: 0 | Status: COMPLETED
Start: 2024-07-24

## 2024-08-02 NOTE — HISTORY OF PRESENT ILLNESS
[N/A] : N/A [Denies] : Denies [No] : No [Yes] : Yes [Informed consent documented in EHR.] : Informed consent documented in EHR. [22g] : 22g [Start Time: ___] : Medication Start Time: [unfilled] [End Time: ___] : Medication End Time: [unfilled] [Medication Name: ___] : Medication Name: [unfilled] [Total Amount Administered: ___] : Total Amount Administered: [unfilled] [IV discontinued. Intact. No signs or symptoms of IV complications noted. Time: ___] : IV discontinued. Intact. No signs or symptoms of IV complications noted. Time: [unfilled] [Patient  instructed to seek medical attention with signs and symptoms of adverse effects] : Patient  instructed to seek medical attention with signs and symptoms of adverse effects [Patient left unit in no acute distress] : Patient left unit in no acute distress [Medications administered as ordered and tolerated well.] : Medications administered as ordered and tolerated well. [de-identified] : left arm median cubital vein [de-identified] : no labs [de-identified] : Patient presents for scheduled Remicade infusion, ambulatory in [facility]. Today, patient reports overall to be doing well and reports one BM daily, formed stool without mucous or blood present. Patient denies pain, cramping, bloating and no urgency. Patient admits taking Prednisone 30 mg daily.  No other concerns or symptoms verbalized. Premeds declined by the patient. Medication administered as prescribed, and infusion tolerated well.

## 2024-08-23 ENCOUNTER — APPOINTMENT (OUTPATIENT)
Dept: OPHTHALMOLOGY | Facility: CLINIC | Age: 39
End: 2024-08-23
Payer: COMMERCIAL

## 2024-08-23 ENCOUNTER — NON-APPOINTMENT (OUTPATIENT)
Age: 39
End: 2024-08-23

## 2024-08-23 PROCEDURE — 99199 UNLISTED SPECIAL SVC PX/RPRT: CPT | Mod: NC

## 2024-08-30 ENCOUNTER — APPOINTMENT (OUTPATIENT)
Dept: RHEUMATOLOGY | Facility: CLINIC | Age: 39
End: 2024-08-30
Payer: COMMERCIAL

## 2024-08-30 ENCOUNTER — MED ADMIN CHARGE (OUTPATIENT)
Age: 39
End: 2024-08-30

## 2024-08-30 VITALS
HEART RATE: 103 BPM | DIASTOLIC BLOOD PRESSURE: 75 MMHG | OXYGEN SATURATION: 98 % | TEMPERATURE: 97.6 F | RESPIRATION RATE: 16 BRPM | SYSTOLIC BLOOD PRESSURE: 118 MMHG

## 2024-08-30 VITALS
RESPIRATION RATE: 16 BRPM | DIASTOLIC BLOOD PRESSURE: 73 MMHG | OXYGEN SATURATION: 98 % | SYSTOLIC BLOOD PRESSURE: 109 MMHG | HEART RATE: 87 BPM

## 2024-08-30 PROCEDURE — 96413 CHEMO IV INFUSION 1 HR: CPT

## 2024-08-30 PROCEDURE — 96415 CHEMO IV INFUSION ADDL HR: CPT

## 2024-08-30 RX ORDER — INFLIXIMAB 100 MG/10ML
100 INJECTION, POWDER, LYOPHILIZED, FOR SOLUTION INTRAVENOUS
Qty: 1 | Refills: 0 | Status: COMPLETED
Start: 2024-07-24

## 2024-08-30 NOTE — HISTORY OF PRESENT ILLNESS
[N/A] : N/A [Denies] : Denies [No] : No [Yes] : Yes [Informed consent documented in EHR.] : Informed consent documented in EHR. [22g] : 22g [Start Time: ___] : Medication Start Time: [unfilled] [End Time: ___] : Medication End Time: [unfilled] [Medication Name: ___] : Medication Name: [unfilled] [Total Amount Administered: ___] : Total Amount Administered: [unfilled] [IV discontinued. Intact. No signs or symptoms of IV complications noted. Time: ___] : IV discontinued. Intact. No signs or symptoms of IV complications noted. Time: [unfilled] [Patient  instructed to seek medical attention with signs and symptoms of adverse effects] : Patient  instructed to seek medical attention with signs and symptoms of adverse effects [Patient left unit in no acute distress] : Patient left unit in no acute distress [Medications administered as ordered and tolerated well.] : Medications administered as ordered and tolerated well. [de-identified] : left arm median accessory cephalic vein [de-identified] : no labs [de-identified] : Patient presents for scheduled Remicade infusion, ambulatory in [facility]. Today, patient reports overall to be doing well and reports to have one BM daily, formed stool without mucous or blood present. Patient denies pain, cramping, bloating and no urgency. Patient admits taking Prednisone 10 mg daily.  No other concerns or symptoms verbalized. Medication administered as prescribed, and infusion tolerated well.

## 2024-09-24 ENCOUNTER — APPOINTMENT (OUTPATIENT)
Dept: OPHTHALMOLOGY | Facility: CLINIC | Age: 39
End: 2024-09-24
Payer: COMMERCIAL

## 2024-09-24 ENCOUNTER — APPOINTMENT (OUTPATIENT)
Dept: OPHTHALMOLOGY | Facility: CLINIC | Age: 39
End: 2024-09-24

## 2024-09-24 ENCOUNTER — NON-APPOINTMENT (OUTPATIENT)
Age: 39
End: 2024-09-24

## 2024-09-24 PROCEDURE — 92083 EXTENDED VISUAL FIELD XM: CPT

## 2024-09-24 PROCEDURE — 92133 CPTRZD OPH DX IMG PST SGM ON: CPT

## 2024-09-24 PROCEDURE — 92012 INTRM OPH EXAM EST PATIENT: CPT

## 2024-09-26 ENCOUNTER — NON-APPOINTMENT (OUTPATIENT)
Age: 39
End: 2024-09-26

## 2024-09-27 ENCOUNTER — MED ADMIN CHARGE (OUTPATIENT)
Age: 39
End: 2024-09-27

## 2024-09-27 ENCOUNTER — APPOINTMENT (OUTPATIENT)
Dept: RHEUMATOLOGY | Facility: CLINIC | Age: 39
End: 2024-09-27
Payer: COMMERCIAL

## 2024-09-27 VITALS
OXYGEN SATURATION: 97 % | TEMPERATURE: 98.3 F | DIASTOLIC BLOOD PRESSURE: 71 MMHG | HEART RATE: 92 BPM | SYSTOLIC BLOOD PRESSURE: 103 MMHG | RESPIRATION RATE: 16 BRPM

## 2024-09-27 VITALS — HEART RATE: 82 BPM | DIASTOLIC BLOOD PRESSURE: 73 MMHG | SYSTOLIC BLOOD PRESSURE: 105 MMHG | OXYGEN SATURATION: 98 %

## 2024-09-27 PROCEDURE — 96415 CHEMO IV INFUSION ADDL HR: CPT

## 2024-09-27 PROCEDURE — 96413 CHEMO IV INFUSION 1 HR: CPT

## 2024-09-27 RX ORDER — INFLIXIMAB 100 MG/10ML
100 INJECTION, POWDER, LYOPHILIZED, FOR SOLUTION INTRAVENOUS
Qty: 1 | Refills: 0 | Status: COMPLETED
Start: 2024-07-24

## 2024-09-27 NOTE — HISTORY OF PRESENT ILLNESS
[N/A] : N/A [Denies] : Denies [No] : No [Yes] : Yes [Informed consent documented in EHR.] : Informed consent documented in EHR. [22g] : 22g [Start Time: ___] : Medication Start Time: [unfilled] [End Time: ___] : Medication End Time: [unfilled] [Medication Name: ___] : Medication Name: [unfilled] [Total Amount Administered: ___] : Total Amount Administered: [unfilled] [IV discontinued. Intact. No signs or symptoms of IV complications noted. Time: ___] : IV discontinued. Intact. No signs or symptoms of IV complications noted. Time: [unfilled] [Patient  instructed to seek medical attention with signs and symptoms of adverse effects] : Patient  instructed to seek medical attention with signs and symptoms of adverse effects [Patient left unit in no acute distress] : Patient left unit in no acute distress [Medications administered as ordered and tolerated well.] : Medications administered as ordered and tolerated well. [de-identified] : right arm median accessory cephalic vein [de-identified] : no labs [de-identified] : Patient presents for scheduled Remicade infusion, ambulatory in [facility]. Today, patient reports overall to be doing well and reports to have one BM daily, formed stool without mucous or blood present. Patient denies pain, cramping, bloating and no urgency. No other concerns or symptoms verbalized. Medication administered as prescribed, and infusion tolerated well.

## 2024-10-11 ENCOUNTER — APPOINTMENT (OUTPATIENT)
Dept: GASTROENTEROLOGY | Facility: CLINIC | Age: 39
End: 2024-10-11
Payer: COMMERCIAL

## 2024-10-11 VITALS
HEART RATE: 103 BPM | OXYGEN SATURATION: 97 % | DIASTOLIC BLOOD PRESSURE: 77 MMHG | SYSTOLIC BLOOD PRESSURE: 124 MMHG | BODY MASS INDEX: 23.49 KG/M2 | WEIGHT: 155 LBS | HEIGHT: 68 IN

## 2024-10-11 DIAGNOSIS — K51.90 ULCERATIVE COLITIS, UNSPECIFIED, W/OUT COMPLICATIONS: ICD-10-CM

## 2024-10-11 PROCEDURE — 99213 OFFICE O/P EST LOW 20 MIN: CPT

## 2024-10-11 PROCEDURE — G2211 COMPLEX E/M VISIT ADD ON: CPT

## 2024-10-25 ENCOUNTER — MED ADMIN CHARGE (OUTPATIENT)
Age: 39
End: 2024-10-25

## 2024-10-25 ENCOUNTER — APPOINTMENT (OUTPATIENT)
Dept: RHEUMATOLOGY | Facility: CLINIC | Age: 39
End: 2024-10-25
Payer: COMMERCIAL

## 2024-10-25 VITALS
RESPIRATION RATE: 16 BRPM | DIASTOLIC BLOOD PRESSURE: 73 MMHG | OXYGEN SATURATION: 97 % | SYSTOLIC BLOOD PRESSURE: 107 MMHG | TEMPERATURE: 98 F | HEART RATE: 103 BPM

## 2024-10-25 VITALS
OXYGEN SATURATION: 98 % | HEART RATE: 73 BPM | SYSTOLIC BLOOD PRESSURE: 107 MMHG | RESPIRATION RATE: 16 BRPM | DIASTOLIC BLOOD PRESSURE: 74 MMHG

## 2024-10-25 PROCEDURE — 96413 CHEMO IV INFUSION 1 HR: CPT

## 2024-10-25 PROCEDURE — 96415 CHEMO IV INFUSION ADDL HR: CPT

## 2024-10-25 RX ORDER — INFLIXIMAB 100 MG/10ML
100 INJECTION, POWDER, LYOPHILIZED, FOR SOLUTION INTRAVENOUS
Qty: 1 | Refills: 0 | Status: COMPLETED
Start: 2024-07-24

## 2024-10-25 RX ORDER — ACETAMINOPHEN 325 MG/1
325 TABLET ORAL
Qty: 0 | Refills: 0 | Status: COMPLETED
Start: 2024-01-31

## 2024-11-06 ENCOUNTER — TRANSCRIPTION ENCOUNTER (OUTPATIENT)
Age: 39
End: 2024-11-06

## 2024-11-06 DIAGNOSIS — K51.90 ULCERATIVE COLITIS, UNSPECIFIED, W/OUT COMPLICATIONS: ICD-10-CM

## 2024-11-07 ENCOUNTER — TRANSCRIPTION ENCOUNTER (OUTPATIENT)
Age: 39
End: 2024-11-07

## 2024-11-21 ENCOUNTER — TRANSCRIPTION ENCOUNTER (OUTPATIENT)
Age: 39
End: 2024-11-21

## 2024-11-22 ENCOUNTER — MED ADMIN CHARGE (OUTPATIENT)
Age: 39
End: 2024-11-22

## 2024-11-22 ENCOUNTER — APPOINTMENT (OUTPATIENT)
Dept: RHEUMATOLOGY | Facility: CLINIC | Age: 39
End: 2024-11-22
Payer: COMMERCIAL

## 2024-11-22 VITALS
HEART RATE: 78 BPM | RESPIRATION RATE: 16 BRPM | SYSTOLIC BLOOD PRESSURE: 108 MMHG | TEMPERATURE: 97.8 F | DIASTOLIC BLOOD PRESSURE: 75 MMHG | OXYGEN SATURATION: 97 %

## 2024-11-22 VITALS
SYSTOLIC BLOOD PRESSURE: 125 MMHG | DIASTOLIC BLOOD PRESSURE: 78 MMHG | RESPIRATION RATE: 16 BRPM | HEART RATE: 79 BPM | OXYGEN SATURATION: 97 %

## 2024-11-22 PROCEDURE — 36415 COLL VENOUS BLD VENIPUNCTURE: CPT

## 2024-11-22 PROCEDURE — 96413 CHEMO IV INFUSION 1 HR: CPT

## 2024-11-22 PROCEDURE — 96415 CHEMO IV INFUSION ADDL HR: CPT

## 2024-11-22 RX ORDER — INFLIXIMAB 100 MG/10ML
100 INJECTION, POWDER, LYOPHILIZED, FOR SOLUTION INTRAVENOUS
Qty: 1 | Refills: 0 | Status: COMPLETED
Start: 2024-07-24

## 2024-12-19 ENCOUNTER — MED ADMIN CHARGE (OUTPATIENT)
Age: 39
End: 2024-12-19

## 2024-12-19 ENCOUNTER — APPOINTMENT (OUTPATIENT)
Dept: RHEUMATOLOGY | Facility: CLINIC | Age: 39
End: 2024-12-19
Payer: COMMERCIAL

## 2024-12-19 VITALS
TEMPERATURE: 97.3 F | OXYGEN SATURATION: 95 % | RESPIRATION RATE: 16 BRPM | DIASTOLIC BLOOD PRESSURE: 75 MMHG | HEART RATE: 101 BPM | SYSTOLIC BLOOD PRESSURE: 131 MMHG

## 2024-12-19 VITALS — DIASTOLIC BLOOD PRESSURE: 78 MMHG | SYSTOLIC BLOOD PRESSURE: 127 MMHG | HEART RATE: 85 BPM

## 2024-12-19 PROCEDURE — 96413 CHEMO IV INFUSION 1 HR: CPT

## 2024-12-19 PROCEDURE — 96415 CHEMO IV INFUSION ADDL HR: CPT

## 2024-12-19 RX ORDER — ACETAMINOPHEN 325 MG/1
325 TABLET ORAL
Qty: 0 | Refills: 0 | Status: COMPLETED
Start: 2024-01-31

## 2024-12-19 RX ORDER — CETIRIZINE HYDROCHLORIDE 10 MG/1
10 TABLET, FILM COATED ORAL
Qty: 0 | Refills: 0 | Status: COMPLETED
Start: 2024-01-31

## 2024-12-19 RX ORDER — INFLIXIMAB 100 MG/10ML
100 INJECTION, POWDER, LYOPHILIZED, FOR SOLUTION INTRAVENOUS
Qty: 1 | Refills: 0 | Status: COMPLETED
Start: 2024-07-24

## 2024-12-19 RX ADMIN — INFLIXIMAB 0 MG: 100 INJECTION, POWDER, LYOPHILIZED, FOR SOLUTION INTRAVENOUS at 00:00

## 2024-12-21 RX ORDER — INFLIXIMAB 100 MG/10ML
100 INJECTION, POWDER, LYOPHILIZED, FOR SOLUTION INTRAVENOUS
Refills: 0 | Status: ACTIVE | OUTPATIENT
Start: 2024-12-19

## 2025-01-13 ENCOUNTER — APPOINTMENT (OUTPATIENT)
Dept: DERMATOLOGY | Facility: CLINIC | Age: 40
End: 2025-01-13
Payer: COMMERCIAL

## 2025-01-13 VITALS — BODY MASS INDEX: 24.25 KG/M2 | HEIGHT: 68 IN | WEIGHT: 160 LBS

## 2025-01-13 DIAGNOSIS — L30.9 DERMATITIS, UNSPECIFIED: ICD-10-CM

## 2025-01-13 DIAGNOSIS — L21.9 SEBORRHEIC DERMATITIS, UNSPECIFIED: ICD-10-CM

## 2025-01-13 PROCEDURE — 99204 OFFICE O/P NEW MOD 45 MIN: CPT

## 2025-01-13 RX ORDER — HYDROCORTISONE 25 MG/G
2.5 CREAM TOPICAL
Qty: 1 | Refills: 2 | Status: ACTIVE | COMMUNITY
Start: 2025-01-13 | End: 1900-01-01

## 2025-01-13 RX ORDER — KETOCONAZOLE 20 MG/ML
2 SUSPENSION TOPICAL
Qty: 1 | Refills: 12 | Status: ACTIVE | COMMUNITY
Start: 2025-01-13 | End: 1900-01-01

## 2025-01-13 RX ORDER — CLOBETASOL PROPIONATE 0.5 MG/G
0.05 OINTMENT TOPICAL
Qty: 1 | Refills: 2 | Status: ACTIVE | COMMUNITY
Start: 2025-01-13 | End: 1900-01-01

## 2025-01-15 ENCOUNTER — TRANSCRIPTION ENCOUNTER (OUTPATIENT)
Age: 40
End: 2025-01-15

## 2025-01-16 ENCOUNTER — TRANSCRIPTION ENCOUNTER (OUTPATIENT)
Age: 40
End: 2025-01-16

## 2025-01-16 ENCOUNTER — MED ADMIN CHARGE (OUTPATIENT)
Age: 40
End: 2025-01-16

## 2025-01-16 ENCOUNTER — APPOINTMENT (OUTPATIENT)
Dept: RHEUMATOLOGY | Facility: CLINIC | Age: 40
End: 2025-01-16
Payer: COMMERCIAL

## 2025-01-16 VITALS
RESPIRATION RATE: 16 BRPM | DIASTOLIC BLOOD PRESSURE: 77 MMHG | OXYGEN SATURATION: 97 % | TEMPERATURE: 96 F | HEART RATE: 126 BPM | SYSTOLIC BLOOD PRESSURE: 124 MMHG

## 2025-01-16 VITALS
WEIGHT: 160 LBS | BODY MASS INDEX: 24.33 KG/M2 | SYSTOLIC BLOOD PRESSURE: 125 MMHG | HEART RATE: 92 BPM | RESPIRATION RATE: 16 BRPM | OXYGEN SATURATION: 98 % | DIASTOLIC BLOOD PRESSURE: 78 MMHG

## 2025-01-16 PROCEDURE — 96415 CHEMO IV INFUSION ADDL HR: CPT

## 2025-01-16 PROCEDURE — 36415 COLL VENOUS BLD VENIPUNCTURE: CPT

## 2025-01-16 PROCEDURE — 96413 CHEMO IV INFUSION 1 HR: CPT

## 2025-01-16 RX ORDER — INFLIXIMAB 100 MG/10ML
100 INJECTION, POWDER, LYOPHILIZED, FOR SOLUTION INTRAVENOUS
Qty: 1 | Refills: 0 | Status: COMPLETED
Start: 2024-12-19

## 2025-01-19 LAB — HBV SURFACE AG SER QL: NONREACTIVE

## 2025-01-22 LAB
M TB IFN-G BLD-IMP: NEGATIVE
QUANTIFERON TB PLUS MITOGEN MINUS NIL: >10 IU/ML
QUANTIFERON TB PLUS NIL: 0.04 IU/ML
QUANTIFERON TB PLUS TB1 MINUS NIL: 0 IU/ML
QUANTIFERON TB PLUS TB2 MINUS NIL: 0 IU/ML

## 2025-02-07 ENCOUNTER — APPOINTMENT (OUTPATIENT)
Dept: GASTROENTEROLOGY | Facility: CLINIC | Age: 40
End: 2025-02-07
Payer: COMMERCIAL

## 2025-02-07 VITALS
SYSTOLIC BLOOD PRESSURE: 127 MMHG | HEIGHT: 68 IN | WEIGHT: 160 LBS | OXYGEN SATURATION: 96 % | HEART RATE: 105 BPM | DIASTOLIC BLOOD PRESSURE: 80 MMHG | BODY MASS INDEX: 24.25 KG/M2

## 2025-02-07 DIAGNOSIS — Z92.241 PERSONAL HISTORY OF SYSTEMIC STEROID THERAPY: ICD-10-CM

## 2025-02-07 PROCEDURE — ZZZZZ: CPT

## 2025-02-08 ENCOUNTER — APPOINTMENT (OUTPATIENT)
Dept: INTERNAL MEDICINE | Facility: CLINIC | Age: 40
End: 2025-02-08
Payer: COMMERCIAL

## 2025-02-08 VITALS
OXYGEN SATURATION: 98 % | TEMPERATURE: 98.2 F | HEART RATE: 76 BPM | BODY MASS INDEX: 24.25 KG/M2 | RESPIRATION RATE: 16 BRPM | WEIGHT: 160 LBS | HEIGHT: 68 IN | DIASTOLIC BLOOD PRESSURE: 76 MMHG | SYSTOLIC BLOOD PRESSURE: 112 MMHG

## 2025-02-08 DIAGNOSIS — E55.9 VITAMIN D DEFICIENCY, UNSPECIFIED: ICD-10-CM

## 2025-02-08 DIAGNOSIS — Z00.00 ENCOUNTER FOR GENERAL ADULT MEDICAL EXAMINATION W/OUT ABNORMAL FINDINGS: ICD-10-CM

## 2025-02-08 DIAGNOSIS — K51.90 ULCERATIVE COLITIS, UNSPECIFIED, W/OUT COMPLICATIONS: ICD-10-CM

## 2025-02-08 DIAGNOSIS — Z86.718 PERSONAL HISTORY OF OTHER VENOUS THROMBOSIS AND EMBOLISM: ICD-10-CM

## 2025-02-08 PROCEDURE — G0444 DEPRESSION SCREEN ANNUAL: CPT | Mod: 59

## 2025-02-08 PROCEDURE — 93000 ELECTROCARDIOGRAM COMPLETE: CPT | Mod: 59

## 2025-02-08 PROCEDURE — 99395 PREV VISIT EST AGE 18-39: CPT

## 2025-02-09 PROBLEM — Z86.718 HISTORY OF RECURRENT DEEP VEIN THROMBOSIS (DVT): Status: ACTIVE | Noted: 2025-02-09

## 2025-02-12 LAB
25(OH)D3 SERPL-MCNC: 17.6 NG/ML
ALBUMIN SERPL ELPH-MCNC: 4.3 G/DL
ALP BLD-CCNC: 57 U/L
ALT SERPL-CCNC: 23 U/L
ANION GAP SERPL CALC-SCNC: 11 MMOL/L
APPEARANCE: CLEAR
AST SERPL-CCNC: 19 U/L
BASOPHILS # BLD AUTO: 0.06 K/UL
BASOPHILS NFR BLD AUTO: 0.5 %
BILIRUB SERPL-MCNC: 0.6 MG/DL
BILIRUBIN URINE: NEGATIVE
BLOOD URINE: NEGATIVE
BUN SERPL-MCNC: 13 MG/DL
CALCIUM SERPL-MCNC: 9.1 MG/DL
CARDIOLIPIN IGM SER-MCNC: 16.7 MPL U/ML
CARDIOLIPIN IGM SER-MCNC: <1.6 GPL U/ML
CHLORIDE SERPL-SCNC: 105 MMOL/L
CHOLEST SERPL-MCNC: 240 MG/DL
CK SERPL-CCNC: 140 U/L
CO2 SERPL-SCNC: 26 MMOL/L
COLOR: YELLOW
CREAT SERPL-MCNC: 0.82 MG/DL
CREAT SPEC-SCNC: 220 MG/DL
CRP SERPL-MCNC: <3 MG/L
DEPRECATED CARDIOLIPIN IGA SER: 3.7 APL U/ML
EGFR: 115 ML/MIN/1.73M2
EOSINOPHIL # BLD AUTO: 0.15 K/UL
EOSINOPHIL NFR BLD AUTO: 1.3 %
ERYTHROCYTE [SEDIMENTATION RATE] IN BLOOD BY WESTERGREN METHOD: 9 MM/HR
ESTIMATED AVERAGE GLUCOSE: 126 MG/DL
FERRITIN SERPL-MCNC: 109 NG/ML
FOLATE SERPL-MCNC: 13 NG/ML
GLUCOSE QUALITATIVE U: NEGATIVE MG/DL
GLUCOSE SERPL-MCNC: 83 MG/DL
HBA1C MFR BLD HPLC: 6 %
HCT VFR BLD CALC: 50.7 %
HDLC SERPL-MCNC: 73 MG/DL
HGB BLD-MCNC: 15.9 G/DL
IMM GRANULOCYTES NFR BLD AUTO: 0.6 %
IRON SATN MFR SERPL: 73 %
IRON SERPL-MCNC: 241 UG/DL
KETONES URINE: NEGATIVE MG/DL
LDLC SERPL CALC-MCNC: 138 MG/DL
LEUKOCYTE ESTERASE URINE: NEGATIVE
LYMPHOCYTES # BLD AUTO: 3.05 K/UL
LYMPHOCYTES NFR BLD AUTO: 26 %
MAGNESIUM SERPL-MCNC: 2.2 MG/DL
MAN DIFF?: NORMAL
MCHC RBC-ENTMCNC: 27.9 PG
MCHC RBC-ENTMCNC: 31.4 G/DL
MCV RBC AUTO: 89.1 FL
MICROALBUMIN 24H UR DL<=1MG/L-MCNC: <1.2 MG/DL
MICROALBUMIN/CREAT 24H UR-RTO: NORMAL MG/G
MONOCYTES # BLD AUTO: 0.7 K/UL
MONOCYTES NFR BLD AUTO: 6 %
NEUTROPHILS # BLD AUTO: 7.71 K/UL
NEUTROPHILS NFR BLD AUTO: 65.6 %
NITRITE URINE: NEGATIVE
NONHDLC SERPL-MCNC: 167 MG/DL
PH URINE: 6.5
PLATELET # BLD AUTO: 302 K/UL
POTASSIUM SERPL-SCNC: 4 MMOL/L
PROT SERPL-MCNC: 7.2 G/DL
PROTEIN URINE: NEGATIVE MG/DL
RBC # BLD: 5.69 M/UL
RBC # FLD: 14.7 %
SODIUM SERPL-SCNC: 143 MMOL/L
SPECIFIC GRAVITY URINE: 1.02
T4 FREE SERPL-MCNC: 0.9 NG/DL
TIBC SERPL-MCNC: 330 UG/DL
TRIGL SERPL-MCNC: 165 MG/DL
TSH SERPL-ACNC: 1.28 UIU/ML
UIBC SERPL-MCNC: 89 UG/DL
UROBILINOGEN URINE: 0.2 MG/DL
VIT B12 SERPL-MCNC: 435 PG/ML
WBC # FLD AUTO: 11.74 K/UL

## 2025-02-13 ENCOUNTER — NON-APPOINTMENT (OUTPATIENT)
Age: 40
End: 2025-02-13

## 2025-02-13 ENCOUNTER — MED ADMIN CHARGE (OUTPATIENT)
Age: 40
End: 2025-02-13

## 2025-02-13 ENCOUNTER — APPOINTMENT (OUTPATIENT)
Dept: RHEUMATOLOGY | Facility: CLINIC | Age: 40
End: 2025-02-13
Payer: COMMERCIAL

## 2025-02-13 VITALS
OXYGEN SATURATION: 97 % | RESPIRATION RATE: 16 BRPM | TEMPERATURE: 97.8 F | SYSTOLIC BLOOD PRESSURE: 120 MMHG | HEART RATE: 85 BPM | DIASTOLIC BLOOD PRESSURE: 55 MMHG

## 2025-02-13 VITALS
OXYGEN SATURATION: 98 % | DIASTOLIC BLOOD PRESSURE: 72 MMHG | HEART RATE: 78 BPM | RESPIRATION RATE: 16 BRPM | SYSTOLIC BLOOD PRESSURE: 110 MMHG

## 2025-02-13 PROCEDURE — 96415 CHEMO IV INFUSION ADDL HR: CPT

## 2025-02-13 PROCEDURE — 96413 CHEMO IV INFUSION 1 HR: CPT

## 2025-02-13 RX ORDER — INFLIXIMAB 100 MG/10ML
100 INJECTION, POWDER, LYOPHILIZED, FOR SOLUTION INTRAVENOUS
Qty: 1 | Refills: 0 | Status: COMPLETED
Start: 2024-12-19

## 2025-02-14 ENCOUNTER — TRANSCRIPTION ENCOUNTER (OUTPATIENT)
Age: 40
End: 2025-02-14

## 2025-02-14 DIAGNOSIS — E83.19 OTHER DISORDERS OF IRON METABOLISM: ICD-10-CM

## 2025-02-18 ENCOUNTER — TRANSCRIPTION ENCOUNTER (OUTPATIENT)
Age: 40
End: 2025-02-18

## 2025-02-20 ENCOUNTER — NON-APPOINTMENT (OUTPATIENT)
Age: 40
End: 2025-02-20

## 2025-02-21 LAB
FERRITIN SERPL-MCNC: 137 NG/ML
IRON SATN MFR SERPL: 43 %
IRON SERPL-MCNC: 145 UG/DL
TIBC SERPL-MCNC: 335 UG/DL
UIBC SERPL-MCNC: 190 UG/DL

## 2025-02-25 ENCOUNTER — TRANSCRIPTION ENCOUNTER (OUTPATIENT)
Age: 40
End: 2025-02-25

## 2025-03-10 ENCOUNTER — APPOINTMENT (OUTPATIENT)
Dept: DERMATOLOGY | Facility: CLINIC | Age: 40
End: 2025-03-10

## 2025-03-14 ENCOUNTER — MED ADMIN CHARGE (OUTPATIENT)
Age: 40
End: 2025-03-14

## 2025-03-14 ENCOUNTER — APPOINTMENT (OUTPATIENT)
Dept: RHEUMATOLOGY | Facility: CLINIC | Age: 40
End: 2025-03-14
Payer: COMMERCIAL

## 2025-03-14 VITALS
SYSTOLIC BLOOD PRESSURE: 98 MMHG | DIASTOLIC BLOOD PRESSURE: 67 MMHG | RESPIRATION RATE: 16 BRPM | HEART RATE: 69 BPM | OXYGEN SATURATION: 98 %

## 2025-03-14 VITALS
TEMPERATURE: 97.2 F | HEART RATE: 106 BPM | RESPIRATION RATE: 16 BRPM | SYSTOLIC BLOOD PRESSURE: 105 MMHG | OXYGEN SATURATION: 97 % | DIASTOLIC BLOOD PRESSURE: 71 MMHG

## 2025-03-14 PROCEDURE — 96413 CHEMO IV INFUSION 1 HR: CPT

## 2025-03-14 PROCEDURE — 36415 COLL VENOUS BLD VENIPUNCTURE: CPT

## 2025-03-14 PROCEDURE — 96415 CHEMO IV INFUSION ADDL HR: CPT

## 2025-03-14 RX ORDER — INFLIXIMAB 100 MG/10ML
100 INJECTION, POWDER, LYOPHILIZED, FOR SOLUTION INTRAVENOUS
Qty: 1 | Refills: 0 | Status: COMPLETED
Start: 2024-12-19

## 2025-03-18 ENCOUNTER — APPOINTMENT (OUTPATIENT)
Dept: OPHTHALMOLOGY | Facility: CLINIC | Age: 40
End: 2025-03-18

## 2025-04-01 ENCOUNTER — APPOINTMENT (OUTPATIENT)
Dept: DERMATOLOGY | Facility: CLINIC | Age: 40
End: 2025-04-01
Payer: COMMERCIAL

## 2025-04-01 ENCOUNTER — APPOINTMENT (OUTPATIENT)
Dept: OPHTHALMOLOGY | Facility: CLINIC | Age: 40
End: 2025-04-01

## 2025-04-01 DIAGNOSIS — L81.0 POSTINFLAMMATORY HYPERPIGMENTATION: ICD-10-CM

## 2025-04-01 DIAGNOSIS — L21.9 SEBORRHEIC DERMATITIS, UNSPECIFIED: ICD-10-CM

## 2025-04-01 DIAGNOSIS — L30.9 DERMATITIS, UNSPECIFIED: ICD-10-CM

## 2025-04-01 PROCEDURE — 99214 OFFICE O/P EST MOD 30 MIN: CPT

## 2025-04-01 RX ORDER — HYDROQUINONE 40 MG/G
4 CREAM TOPICAL
Qty: 1 | Refills: 2 | Status: ACTIVE | COMMUNITY
Start: 2025-04-01 | End: 1900-01-01

## 2025-04-11 ENCOUNTER — APPOINTMENT (OUTPATIENT)
Dept: RHEUMATOLOGY | Facility: CLINIC | Age: 40
End: 2025-04-11
Payer: COMMERCIAL

## 2025-04-11 ENCOUNTER — MED ADMIN CHARGE (OUTPATIENT)
Age: 40
End: 2025-04-11

## 2025-04-11 VITALS
RESPIRATION RATE: 16 BRPM | SYSTOLIC BLOOD PRESSURE: 98 MMHG | OXYGEN SATURATION: 98 % | DIASTOLIC BLOOD PRESSURE: 69 MMHG | HEART RATE: 88 BPM | TEMPERATURE: 97.6 F

## 2025-04-11 VITALS
HEART RATE: 79 BPM | OXYGEN SATURATION: 100 % | SYSTOLIC BLOOD PRESSURE: 111 MMHG | DIASTOLIC BLOOD PRESSURE: 70 MMHG | RESPIRATION RATE: 16 BRPM

## 2025-04-11 PROCEDURE — 96413 CHEMO IV INFUSION 1 HR: CPT

## 2025-04-11 PROCEDURE — 96415 CHEMO IV INFUSION ADDL HR: CPT

## 2025-04-11 RX ORDER — INFLIXIMAB 100 MG/10ML
100 INJECTION, POWDER, LYOPHILIZED, FOR SOLUTION INTRAVENOUS
Qty: 1 | Refills: 0 | Status: COMPLETED
Start: 2024-12-19

## 2025-04-19 ENCOUNTER — APPOINTMENT (OUTPATIENT)
Dept: INTERNAL MEDICINE | Facility: CLINIC | Age: 40
End: 2025-04-19
Payer: COMMERCIAL

## 2025-04-19 VITALS
DIASTOLIC BLOOD PRESSURE: 52 MMHG | HEIGHT: 68 IN | WEIGHT: 152.5 LBS | SYSTOLIC BLOOD PRESSURE: 97 MMHG | OXYGEN SATURATION: 97 % | HEART RATE: 109 BPM | BODY MASS INDEX: 23.11 KG/M2

## 2025-04-19 DIAGNOSIS — K51.90 ULCERATIVE COLITIS, UNSPECIFIED, W/OUT COMPLICATIONS: ICD-10-CM

## 2025-04-19 DIAGNOSIS — E55.9 VITAMIN D DEFICIENCY, UNSPECIFIED: ICD-10-CM

## 2025-04-19 DIAGNOSIS — Z86.718 PERSONAL HISTORY OF OTHER VENOUS THROMBOSIS AND EMBOLISM: ICD-10-CM

## 2025-04-19 DIAGNOSIS — M76.60 ACHILLES TENDINITIS, UNSPECIFIED LEG: ICD-10-CM

## 2025-04-19 PROCEDURE — 99213 OFFICE O/P EST LOW 20 MIN: CPT

## 2025-04-19 PROCEDURE — G2211 COMPLEX E/M VISIT ADD ON: CPT

## 2025-04-21 PROBLEM — M76.60 PAIN IN ACHILLES TENDON: Status: ACTIVE | Noted: 2025-04-21

## 2025-05-01 ENCOUNTER — TRANSCRIPTION ENCOUNTER (OUTPATIENT)
Age: 40
End: 2025-05-01

## 2025-05-09 ENCOUNTER — APPOINTMENT (OUTPATIENT)
Dept: GASTROENTEROLOGY | Facility: CLINIC | Age: 40
End: 2025-05-09

## 2025-05-13 ENCOUNTER — MED ADMIN CHARGE (OUTPATIENT)
Age: 40
End: 2025-05-13

## 2025-05-13 ENCOUNTER — APPOINTMENT (OUTPATIENT)
Dept: RHEUMATOLOGY | Facility: CLINIC | Age: 40
End: 2025-05-13
Payer: COMMERCIAL

## 2025-05-13 VITALS
DIASTOLIC BLOOD PRESSURE: 64 MMHG | SYSTOLIC BLOOD PRESSURE: 98 MMHG | HEART RATE: 87 BPM | TEMPERATURE: 98.2 F | OXYGEN SATURATION: 99 %

## 2025-05-13 VITALS — OXYGEN SATURATION: 99 % | SYSTOLIC BLOOD PRESSURE: 108 MMHG | HEART RATE: 69 BPM | DIASTOLIC BLOOD PRESSURE: 71 MMHG

## 2025-05-13 PROCEDURE — 36415 COLL VENOUS BLD VENIPUNCTURE: CPT

## 2025-05-13 PROCEDURE — 96415 CHEMO IV INFUSION ADDL HR: CPT

## 2025-05-13 PROCEDURE — 96413 CHEMO IV INFUSION 1 HR: CPT

## 2025-05-13 RX ORDER — INFLIXIMAB 100 MG/10ML
100 INJECTION, POWDER, LYOPHILIZED, FOR SOLUTION INTRAVENOUS
Qty: 1 | Refills: 0 | Status: COMPLETED
Start: 2024-12-19

## 2025-06-13 ENCOUNTER — MED ADMIN CHARGE (OUTPATIENT)
Age: 40
End: 2025-06-13

## 2025-06-13 ENCOUNTER — APPOINTMENT (OUTPATIENT)
Dept: RHEUMATOLOGY | Facility: CLINIC | Age: 40
End: 2025-06-13
Payer: COMMERCIAL

## 2025-06-13 VITALS — SYSTOLIC BLOOD PRESSURE: 113 MMHG | DIASTOLIC BLOOD PRESSURE: 74 MMHG | HEART RATE: 89 BPM

## 2025-06-13 VITALS
HEART RATE: 90 BPM | TEMPERATURE: 98 F | DIASTOLIC BLOOD PRESSURE: 75 MMHG | OXYGEN SATURATION: 99 % | RESPIRATION RATE: 16 BRPM | SYSTOLIC BLOOD PRESSURE: 110 MMHG

## 2025-06-13 PROCEDURE — 36415 COLL VENOUS BLD VENIPUNCTURE: CPT

## 2025-06-13 PROCEDURE — 96415 CHEMO IV INFUSION ADDL HR: CPT

## 2025-06-13 PROCEDURE — 96413 CHEMO IV INFUSION 1 HR: CPT

## 2025-06-13 RX ORDER — INFLIXIMAB 100 MG/10ML
100 INJECTION, POWDER, LYOPHILIZED, FOR SOLUTION INTRAVENOUS
Qty: 1 | Refills: 0 | Status: COMPLETED
Start: 2024-12-19

## 2025-06-25 ENCOUNTER — APPOINTMENT (OUTPATIENT)
Dept: GASTROENTEROLOGY | Facility: CLINIC | Age: 40
End: 2025-06-25
Payer: COMMERCIAL

## 2025-06-25 VITALS
DIASTOLIC BLOOD PRESSURE: 68 MMHG | OXYGEN SATURATION: 96 % | WEIGHT: 160 LBS | HEIGHT: 68 IN | BODY MASS INDEX: 24.25 KG/M2 | SYSTOLIC BLOOD PRESSURE: 110 MMHG | HEART RATE: 86 BPM

## 2025-06-25 PROCEDURE — 99213 OFFICE O/P EST LOW 20 MIN: CPT

## 2025-06-25 PROCEDURE — G2211 COMPLEX E/M VISIT ADD ON: CPT

## 2025-06-27 RX ORDER — ACETAMINOPHEN 325 MG/1
325 TABLET ORAL
Refills: 0 | Status: ACTIVE | OUTPATIENT
Start: 2025-06-27 | End: 1900-01-01

## 2025-06-27 RX ORDER — CETIRIZINE HYDROCHLORIDE 10 MG/1
10 TABLET, FILM COATED ORAL
Refills: 0 | Status: ACTIVE | OUTPATIENT
Start: 2025-06-27 | End: 1900-01-01

## 2025-06-27 RX ORDER — INFLIXIMAB 100 MG/10ML
100 INJECTION, POWDER, LYOPHILIZED, FOR SOLUTION INTRAVENOUS
Qty: 1 | Refills: 0 | Status: ACTIVE | OUTPATIENT
Start: 2025-06-27 | End: 1900-01-01

## 2025-07-09 ENCOUNTER — APPOINTMENT (OUTPATIENT)
Dept: RHEUMATOLOGY | Facility: CLINIC | Age: 40
End: 2025-07-09

## 2025-07-11 ENCOUNTER — APPOINTMENT (OUTPATIENT)
Dept: RHEUMATOLOGY | Facility: CLINIC | Age: 40
End: 2025-07-11
Payer: COMMERCIAL

## 2025-07-11 ENCOUNTER — MED ADMIN CHARGE (OUTPATIENT)
Age: 40
End: 2025-07-11

## 2025-07-11 ENCOUNTER — NON-APPOINTMENT (OUTPATIENT)
Age: 40
End: 2025-07-11

## 2025-07-11 VITALS
HEART RATE: 84 BPM | DIASTOLIC BLOOD PRESSURE: 59 MMHG | OXYGEN SATURATION: 99 % | RESPIRATION RATE: 16 BRPM | TEMPERATURE: 98 F | SYSTOLIC BLOOD PRESSURE: 97 MMHG

## 2025-07-11 VITALS
RESPIRATION RATE: 16 BRPM | DIASTOLIC BLOOD PRESSURE: 72 MMHG | OXYGEN SATURATION: 99 % | SYSTOLIC BLOOD PRESSURE: 108 MMHG | HEART RATE: 82 BPM

## 2025-07-11 PROCEDURE — 96415 CHEMO IV INFUSION ADDL HR: CPT

## 2025-07-11 PROCEDURE — 96413 CHEMO IV INFUSION 1 HR: CPT

## 2025-07-23 ENCOUNTER — TRANSCRIPTION ENCOUNTER (OUTPATIENT)
Age: 40
End: 2025-07-23

## 2025-08-06 ENCOUNTER — APPOINTMENT (OUTPATIENT)
Dept: RHEUMATOLOGY | Facility: CLINIC | Age: 40
End: 2025-08-06
Payer: COMMERCIAL

## 2025-08-06 ENCOUNTER — MED ADMIN CHARGE (OUTPATIENT)
Age: 40
End: 2025-08-06

## 2025-08-06 VITALS
OXYGEN SATURATION: 99 % | TEMPERATURE: 97.6 F | HEART RATE: 71 BPM | RESPIRATION RATE: 16 BRPM | DIASTOLIC BLOOD PRESSURE: 67 MMHG | SYSTOLIC BLOOD PRESSURE: 104 MMHG

## 2025-08-06 VITALS
DIASTOLIC BLOOD PRESSURE: 66 MMHG | RESPIRATION RATE: 16 BRPM | OXYGEN SATURATION: 97 % | HEART RATE: 75 BPM | SYSTOLIC BLOOD PRESSURE: 109 MMHG

## 2025-08-06 PROCEDURE — 96415 CHEMO IV INFUSION ADDL HR: CPT

## 2025-08-06 PROCEDURE — 96413 CHEMO IV INFUSION 1 HR: CPT

## 2025-09-05 ENCOUNTER — APPOINTMENT (OUTPATIENT)
Dept: RHEUMATOLOGY | Facility: CLINIC | Age: 40
End: 2025-09-05
Payer: COMMERCIAL

## 2025-09-05 ENCOUNTER — MED ADMIN CHARGE (OUTPATIENT)
Age: 40
End: 2025-09-05

## 2025-09-05 ENCOUNTER — APPOINTMENT (OUTPATIENT)
Dept: GASTROENTEROLOGY | Facility: CLINIC | Age: 40
End: 2025-09-05

## 2025-09-05 VITALS
RESPIRATION RATE: 16 BRPM | DIASTOLIC BLOOD PRESSURE: 63 MMHG | SYSTOLIC BLOOD PRESSURE: 111 MMHG | HEART RATE: 73 BPM | OXYGEN SATURATION: 98 %

## 2025-09-05 VITALS
RESPIRATION RATE: 16 BRPM | SYSTOLIC BLOOD PRESSURE: 102 MMHG | DIASTOLIC BLOOD PRESSURE: 68 MMHG | TEMPERATURE: 97.9 F | HEART RATE: 85 BPM | OXYGEN SATURATION: 95 %

## 2025-09-05 PROCEDURE — 96415 CHEMO IV INFUSION ADDL HR: CPT

## 2025-09-05 PROCEDURE — 96413 CHEMO IV INFUSION 1 HR: CPT
